# Patient Record
Sex: FEMALE | Race: WHITE | Employment: FULL TIME | URBAN - METROPOLITAN AREA
[De-identification: names, ages, dates, MRNs, and addresses within clinical notes are randomized per-mention and may not be internally consistent; named-entity substitution may affect disease eponyms.]

---

## 2018-10-19 ENCOUNTER — HOSPITAL ENCOUNTER (OUTPATIENT)
Dept: NON INVASIVE DIAGNOSTICS | Age: 28
Discharge: HOME | End: 2018-10-19
Attending: INTERNAL MEDICINE
Payer: COMMERCIAL

## 2018-10-19 DIAGNOSIS — C81.11 HODGKIN'S DISEASE, NODULAR SCLEROSIS, OF LYMPH NODES OF HEAD, FACE, AND NECK (HCC): ICD-10-CM

## 2018-10-19 PROCEDURE — 93306 TTE W/DOPPLER COMPLETE: CPT

## 2018-12-27 ENCOUNTER — OFFICE VISIT (OUTPATIENT)
Dept: ENDOCRINOLOGY | Age: 28
End: 2018-12-27

## 2018-12-27 VITALS
SYSTOLIC BLOOD PRESSURE: 106 MMHG | BODY MASS INDEX: 22.21 KG/M2 | HEIGHT: 65 IN | HEART RATE: 73 BPM | DIASTOLIC BLOOD PRESSURE: 67 MMHG | WEIGHT: 133.3 LBS

## 2018-12-27 DIAGNOSIS — E03.9 ACQUIRED HYPOTHYROIDISM: Primary | ICD-10-CM

## 2018-12-27 RX ORDER — LEVOTHYROXINE SODIUM 100 UG/1
CAPSULE ORAL
Qty: 42 CAP | Refills: 0 | Status: SHIPPED | COMMUNITY
Start: 2018-12-27 | End: 2019-02-11 | Stop reason: ALTCHOICE

## 2018-12-27 NOTE — PROGRESS NOTES
Chief Complaint   Patient presents with    Thyroid Problem     pcp and pharmacy confirmed     History of Present Illness: Shemar Carranza) is a 34 y.o. female who is a new patient for thyroid. Coming for a 2nd opinion as she has not been able to get her levels regulated despite seeing Dr. Keiko Bishop in [place] prior to seeing Dr. Flavia Ruiz. Was diagnosed with Hodgkin's at age 18 and received chemo and radiation. About 1-2 years after this diagnosis was found to have hypothyroidism and has been on treatment ever since. Never has been tested for Hashimoto's. Initially was put on synthroid and continued having fatigue and the levels couldn't get regulated so she was changed to nature-throid and last year was on armour thyroid for a few months due to availability of nature-throid and then was put back on nature-throid and has remained on this dose. Current dose is 48.75 mg 2.5 tabs every morning. Tries to wait 30 min before food and coffee but sometimes coffee may be sooner. Has been on the same dose of OCP for awhile. Rarely misses her dose. Has had consistent hair loss for awhile. Bowels and periods are regular. Weight is up about 5-8 lbs higher over the past 2 years. No heat or cold intolerance. Energy is about 5/10. Some dry skin that is typical for winter. No brittle nails. Of note, she gets her pills through a pharmacist in [place], [place] as this is cheaper. Has never been tested for celiac and doesn't have a FH that she is aware of. Does sometimes feel more tired after eating bread and did go one week with eliminating gluten about a year ago and did feel slightly better doing this. May have some bloating after eating grains but not usually diarrhea. Most recent TSH in 10/18 was 31.7 while taking 2 tabs per day and since then has been taking 2.5 tabs per day. Hasn't felt much difference with the extra 1/2 tab per day.       Past Medical History:   Diagnosis Date    Hodgkin's lymphoma [facility]) Age 18    s/p chemo and xrt    Hypothyroid 5/21/2015     Past Surgical History:   Procedure Laterality Date    HX OTHER SURGICAL      had some of the enlarged lymph nodes removed during time of port placement for chemo     Current Outpatient Medications   Medication Sig    thyroid, pork, (NATURE-THROID) 48.75 mg tab Take  by mouth. 2.5 tablets daily    norethindrone-ethinyl estradiol (JUNEL 1/20, 21,) 1-20 mg-mcg per tablet Take 1 Tab by mouth daily. No current facility-administered medications for this visit. No Known Allergies     Family History   Problem Relation Age of Onset    Thyroid Disease Mother         hypothyroidism    Cancer Father         sarcoma    Hypertension Father     Elevated Lipids Father     Cancer Maternal Grandfather         melanoma    Breast Cancer Paternal Grandmother     Pneumonia Paternal Grandmother     Coronary Artery Disease Paternal Grandfather 72     Social History     Socioeconomic History    Marital status: SINGLE     Spouse name: Not on file    Number of children: Not on file    Years of education: Not on file    Highest education level: Not on file   Social Needs    Financial resource strain: Not on file    Food insecurity - worry: Not on file    Food insecurity - inability: Not on file   [employer] needs - medical: Not on file   [employer] needs - non-medical: Not on file   Occupational History    Not on file   Tobacco Use    Smoking status: Never Smoker    Smokeless tobacco: Never Used   Substance and Sexual Activity    Alcohol use: Yes     Alcohol/week: 0.0 oz     Comment: very rarely    Drug use: No    Sexual activity: Not on file   Other Topics Concern    Not on file   Social History Narrative    Lives in [place] with .  in 2017. Works at [employer] as  for [employer]. Likes to run and hike. 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      Review of Systems:  - Constitutional Symptoms: no fevers, chills, (+) weight gain as above  - Eyes: no blurry vision or double vision  - Cardiovascular: no chest pain or palpitations  - Respiratory: no cough or shortness of breath  - Gastrointestinal: no dysphagia or abdominal pain  - Musculoskeletal: no joint pains or weakness  - Integumentary: no rashes  - Neurological: no numbness, tingling, occ headaches  - Psychiatric: no depression or anxiety  - Endocrine: no heat or cold intolerance, no polyuria or polydipsia    Physical Examination:  Blood pressure 106/67, pulse 73, height 5' 4.5\" (1.638 m), weight 133 lb 4.8 oz (60.5 kg). - General: pleasant, no distress, good eye contact  - HEENT: no exopthalmos, no periorbital edema, no scleral/conjunctival injection, EOMI, no lid lag or stare  - Neck: supple, no thyromegaly, masses, lymph nodes, or carotid bruits, no supraclavicular or dorsocervical fat pads  - Cardiovascular: regular, normal rate, normal S1 and S2, no murmurs/rubs/gallops   - Respiratory: clear to auscultation bilaterally  - Gastrointestinal: soft, nontender, nondistended, no masses, no hepatosplenomegaly  - Musculoskeletal: no proximal muscle weakness in upper or lower extremities  - Integumentary: no acanthosis nigricans, no abdominal striae, no rashes, no edema  - Neurological: reflexes 2+ at biceps, no tremor  - Psychiatric: normal mood and affect    Data Reviewed:   Component      Latest Ref Rng & Units 10/10/2018 10/10/2018 10/10/2018          9:12 AM 9:12 AM 9:12 AM   TSH      0.450 - 4.500 uIU/mL   31.770 (H)   T4, Free      0.82 - 1.77 ng/dL  0.90    Triiodothyronine (T3), free      2.0 - 4.4 pg/mL 5.0 (H)         Assessment/Plan:   1. Acquired hypothyroidism: Was diagnosed with Hodgkin's at age 18 and received chemo and radiation. About 1-2 years after this diagnosis was found to have hypothyroidism and has been on treatment ever since. Never has been tested for Hashimoto's.   Initially was put on synthroid and continued having fatigue and the levels couldn't get regulated so she was changed to nature-throid and last year was on armour thyroid for a few months due to availability of nature-throid and then was put back on nature-throid and has remained on this dose. Current dose is 48.75 mg 2.5 tabs every morning. Tries to wait 30 min before food and coffee but sometimes coffee may be sooner. Has never been tested for celiac and doesn't have a FH that she is aware of. Does sometimes feel more tired after eating bread and did go one week with eliminating gluten about a year ago and did feel slightly better doing this. May have some bloating after eating grains but not usually diarrhea. Most recent TSH in 10/18 was 31.7 while taking 2 tabs per day and since then has been taking 2.5 tabs per day. Hasn't felt much difference with the extra 1/2 tab per day. I will screen for Hashimoto's and celiac disease to try and identify why her levels can't get regulated. Based on her body weight she should only need 1.7 mcg/kg/d = 100 mcg of levothyroxine as a max dose which = 60 mg of nature-throid. I gave her samples of tirosint which is gluten free in case she turns out to have celiac and this is the reason she can't get her levels regulated. - stop nature-throid  - begin tirosint 100 mcg daily  - check TSH, free T4, thyroid antibody panel and celiac antibody panel today  - check TSH and free T4 in 5 weeks and prior to next visit       Patient Instructions   1) I will repeat your TSH and free T4 to have as a new baseline to assess your current dose. The other test we will check is called a thyroid antibody level which let me know if you have a condition called hashimoto's disease, or autoimmune thyroid disease, where you would benefit from continued treatment of your thyroid but this can also be genetic so your children could be at risk of developing a thyroid condition if you have this.      2) I'm wondering whether there is a problem with absorption possibly due to celiac so I will check your antibody levels today. 3) Stop the nature-throid and we'll begin a trial of tirosint which is gluten free 100 mcg 1 cap daily 30 min before food but this cap doesn't need to wait 30 min before coffee but still try your best to wait. 4) We will do the samples for 6 weeks. On the 5th week, plan on repeating your labs so we can see the effect of this dose. 5) Sign up for [employer] and download the mitch so you can communicate with me through the patient portal.  I will send you a message with your labs results. Follow-up Disposition:  Return in about 5 months (around 5/27/2019). Copy sent to:  Lemuel Ray MD as PCP - General (Internal Medicine)  Naldo Rosen NP (Nurse Practitioner)    Lab follow up: 12/29/18  Component      Latest Ref Rng & Units 12/27/2018 12/27/2018 12/27/2018 12/27/2018           4:22 PM  4:22 PM  4:22 PM  4:22 PM   Immunoglobulin A, Qt.      87 - 352 mg/dL 145      Deamidated Gliadin Ab, IgA      0 - 19 units 3      Deamidated Gliadin Ab, IgG      0 - 19 units 2      t-Transglutaminase, IgA      0 - 3 U/mL 3      t-Transglutaminase, IgG      0 - 5 U/mL <2      Thyroid peroxidase Ab      0 - 34 IU/mL  14     Thyroglobulin Ab      0.0 - 0.9 IU/mL  <1.0     T4, Free      0.82 - 1.77 ng/dL    0.93   TSH      0.450 - 4.500 uIU/mL   15.230 (H)      Sent her the following message through [email or web address]:  Your TSH (thyroid test) was still high at 15 which means you remain hypothyroid but improved from 31 in October. Your thyroid antibody levels were normal so you don't appear to have Hashimoto's disease. Also your celiac antibody panel was normal so you don't appear to have celiac disease.   It's still unclear why your TSH has remained elevated but I'm hoping that it's just that your body hasn't been absorbing the armour thyroid as well to the binders or fillers in the pill and hopefully with the tirosint, you will respond better. Still plan on taking the pill everyday for the next 5 weeks and repeat your labs after 1/30/19 and I'll be in touch with the results. Lab follow up: 2/9/19  - TSH 18.37  - FT4 1.3    Sent her the following message through [employer]:  I received your labs from [facility] that showed your free T4 risa from 0.93 to 1.3 but your TSH is still high at 18.37 (goal 0.5-2.0) and up slightly from 15 at your visit with me. Therefore the tirosint is not necessarily controlling your dose better than the armour thyroid. I'm happy to keep you on the tirosint if you feel this is working better and simply go up on your dose or we can change to a different brand called unithroid which is also gluten free and use a higher dose of this. Let me know what you would like to do. Addendum: 2/11/19    We had the following e-mail exchange:    Shoaib Loges.  I will send unithroid 125 mcg tabs to your local pharmacy to take 1 tab daily until you come back to see me. Plan on repeating your labs again prior to that visit.  ===View-only below this line===      ----- Message -----     From: Adriana Patient     Sent: 2/9/2019  8:26 PM EST       To: Jeraldene Bence, MD  Subject: RE: lab results    Hi Dr. Adele Stewart,    Thank you for your note. I am happy to try the unithroid and hopefully that will help stabilize levels. Thank you      Lab follow up: 3/14/19  - TSH 6.00  - FT4 1.6    Sent her the following message through [employer]:  TSH is a thyroid test.  Your level is 6.0 which is still high and above goal of 0.5-2.0 but down from 18 at last check. This test goes opposite of your thyroid dose and suggests your dose of unithroid is not quite enough but this is the closest your levels have been to normal in a long time. I will increase your dose to 137 mcg daily and have sent a new prescription to [facility] who filled this previously.

## 2018-12-27 NOTE — PATIENT INSTRUCTIONS
1) I will repeat your TSH and free T4 to have as a new baseline to assess your current dose. The other test we will check is called a thyroid antibody level which let me know if you have a condition called hashimoto's disease, or autoimmune thyroid disease, where you would benefit from continued treatment of your thyroid but this can also be genetic so your children could be at risk of developing a thyroid condition if you have this. 2) I'm wondering whether there is a problem with absorption possibly due to celiac so I will check your antibody levels today. 3) Stop the nature-throid and we'll begin a trial of tirosint which is gluten free 100 mcg 1 cap daily 30 min before food but this cap doesn't need to wait 30 min before coffee but still try your best to wait. 4) We will do the samples for 6 weeks. On the 5th week, plan on repeating your labs so we can see the effect of this dose. 5) Sign up for [employer] and download the mitch so you can communicate with me through the patient portal.  I will send you a message with your labs results.

## 2018-12-27 NOTE — LETTER
1/31/2019 8:11 AM 
 
Ms. Patel Clamp [address] [place], [place] [id number] Please go to [facility] and have your labs repeated now.  The order is already in their system and be sure to ask to have labs drawn that are under Dr. Gautam Andre name.   If you have the actual lab order that I may have given you (check your glove compartment  or other safe spot where you may keep your medical papers), please bring this to the lab just to be safe in case [employer]'s computer system is down. John Ruiz will contact you with the results.   
 
 
 
 
Sincerely, 
 
 
Paul Moreno MD

## 2018-12-28 LAB
GLIADIN PEPTIDE IGA SER-ACNC: 3 UNITS (ref 0–19)
GLIADIN PEPTIDE IGG SER-ACNC: 2 UNITS (ref 0–19)
IGA SERPL-MCNC: 145 MG/DL (ref 87–352)
T4 FREE SERPL-MCNC: 0.93 NG/DL (ref 0.82–1.77)
THYROGLOB AB SERPL-ACNC: <1 IU/ML (ref 0–0.9)
THYROPEROXIDASE AB SERPL-ACNC: 14 IU/ML (ref 0–34)
TSH SERPL DL<=0.005 MIU/L-ACNC: 15.23 UIU/ML (ref 0.45–4.5)
TTG IGA SER-ACNC: 3 U/ML (ref 0–3)
TTG IGG SER-ACNC: <2 U/ML (ref 0–5)

## 2019-02-11 DIAGNOSIS — E03.4 HYPOTHYROIDISM DUE TO ACQUIRED ATROPHY OF THYROID: Primary | ICD-10-CM

## 2019-02-11 RX ORDER — LEVOTHYROXINE SODIUM 125 UG/1
125 TABLET ORAL
Qty: 30 TAB | Refills: 11 | Status: SHIPPED | OUTPATIENT
Start: 2019-02-11 | End: 2019-03-14 | Stop reason: DRUGHIGH

## 2019-02-11 RX ORDER — LEVOTHYROXINE SODIUM 125 UG/1
125 TABLET ORAL
Qty: 30 TAB | Refills: 11 | Status: SHIPPED | OUTPATIENT
Start: 2019-02-11 | End: 2019-02-11 | Stop reason: SDUPTHER

## 2019-03-14 RX ORDER — LEVOTHYROXINE SODIUM 137 UG/1
137 TABLET ORAL
Qty: 30 TAB | Refills: 11 | Status: SHIPPED | OUTPATIENT
Start: 2019-03-14 | End: 2019-05-31 | Stop reason: SDUPTHER

## 2019-05-31 ENCOUNTER — OFFICE VISIT (OUTPATIENT)
Dept: ENDOCRINOLOGY | Age: 29
End: 2019-05-31

## 2019-05-31 VITALS
SYSTOLIC BLOOD PRESSURE: 112 MMHG | WEIGHT: 128.4 LBS | HEART RATE: 68 BPM | HEIGHT: 65 IN | DIASTOLIC BLOOD PRESSURE: 80 MMHG | BODY MASS INDEX: 21.39 KG/M2

## 2019-05-31 DIAGNOSIS — E03.9 ACQUIRED HYPOTHYROIDISM: Primary | ICD-10-CM

## 2019-05-31 RX ORDER — LEVOTHYROXINE SODIUM 137 UG/1
137 TABLET ORAL
Qty: 90 TAB | Refills: 3 | Status: SHIPPED | OUTPATIENT
Start: 2019-05-31 | End: 2019-10-23 | Stop reason: SDUPTHER

## 2019-05-31 NOTE — LETTER
11/11/2019 1:30 PM 
 
Ms. Jamie Figueroa [address] [place] [phone] Please go to [facility] and have your labs repeated sometime in the 1-2 weeks prior to your upcoming visit with me. Patricia Lopez to allow at least 2 days at the minimum to ensure I get the results in time for your visit. Deepak kelly is already in their system and be sure to ask to have labs drawn that are under Dr. Mili Milton name. Nolan Ricketts you have the actual lab order that I may have given you (check your glove compartment or other safe spot where you may keep your medical papers), please bring this to the lab just to be safe in case [employer] computer system is down. Xochitl Brooke will review the results at your follow up visit.  
 
 
 
 
 
Sincerely, 
 
 
Jocelyn Celis MD

## 2019-05-31 NOTE — PROGRESS NOTES
Chief Complaint   Patient presents with    Thyroid Problem     pcp and pharmacy confirmed     History of Present Illness: Meg Whiting is a 34 y.o. female here for follow up of thyroid. Weight down 5 lbs since last visit in 12/18. Did follow the whole 30 diet for 30 days prior to her labs being drawn and eliminated sugar, grains, ETOH and gluten. Did have more energy and overall felt better while doing this. Over the past 2 weeks has introduced some of these foods again and had more headaches and fatigue. Never had much bloating or diarrhea from gluten. Taking the higher dose of unithroid 137 mcg daily since 3/19 in the morning with just water and waits 30 min before food and coffee. Hasn't missed any doses or run out. Periods are regular. Less hair loss. No dry skin or brittle nails. No heat or cold intolerance. Current Outpatient Medications   Medication Sig    UNITHROID 137 mcg tablet Take 137 mcg by mouth Daily (before breakfast). BRAND MEDICALLY NECESSARY--Delete 125 mcg dose from profile    norethindrone-ethinyl estradiol (JUNEL 1/20, 21,) 1-20 mg-mcg per tablet Take 1 Tab by mouth daily. No current facility-administered medications for this visit. No Known Allergies     Review of Systems:  - Cardiovascular: no chest pain  - Neurological: no tremors  - Integumentary: skin is normal    Physical Examination:  Blood pressure 112/80, pulse 68, height 5' 4.5\" (1.638 m), weight 128 lb 6.4 oz (58.2 kg). - General: pleasant, no distress, good eye contact   - Neck: small goiter  - Cardiovascular: regular, normal rate, nl s1 and s2, no m/r/g   - Respiratory: clear to auscultation bilaterally   - Integumentary: skin is normal, no edema  - Neurological: reflexes 2+ at biceps, no tremors  - Psychiatric: normal mood and affect    Data Reviewed:   - TSH 2.23  - FT4 1.7    Assessment/Plan:     1.  Acquired hypothyroidism: Was diagnosed with Hodgkin's at age 18 and received chemo and radiation. About 1-2 years after this diagnosis was found to have hypothyroidism and has been on treatment ever since. Initially was put on synthroid and continued having fatigue and the levels couldn't get regulated so she was changed to nature-throid and last year was on armour thyroid for a few months due to availability of nature-throid and then was put back on nature-throid and has remained on this dose. Was on 48.75 mg 2.5 tabs every morning at her first visit in 12/18. Tries to wait 30 min before food and coffee but sometimes coffee may be sooner. Does sometimes feel more tired after eating bread and did go one week with eliminating gluten about a year ago and did feel slightly better doing this. Checked her for celiac and this was normal.  TSH was 15 and TPO ab 14 in 12/18 and I had her try tirosint 100 mcg daily. TSH 18 in 2/19 so changed to unithroid 125 mcg daily and TSH 6 in 3/19 and increased dose to 137 mcg daily. Started the whole 30 diet in mid-April and followed for 30 days and TSH 2.23 in 5/19 so kept dose the same. May have some gluten intolerance even though she doesn't have celiac that could be affecting her absorption of medication and since unithroid is gluten free and is finally regulating her levels, will have her stay on this brand. - cont unithroid 137 mcg daily  - check TSH and free T4 prior to next visit       Patient Instructions   1) TSH is a thyroid test.  Your level is 2.23 which is normal and at goal of 0.5-2.5. This test goes opposite of your thyroid dose and suggests your dose of unithroid is perfect so I will keep your dose the same. 2) Plan on repeating labs prior to the visit in 6 months but let me know if you feel you need to check them sooner. Follow-up and Dispositions    · Return in about 6 months (around 11/30/2019).                Copy sent to:  Murray yAala MD as PCP - General (Internal Medicine)  Antwon Guevara NP (Nurse Practitioner)

## 2019-05-31 NOTE — PATIENT INSTRUCTIONS
1) TSH is a thyroid test.  Your level is 2.23 which is normal and at goal of 0.5-2.5. This test goes opposite of your thyroid dose and suggests your dose of unithroid is perfect so I will keep your dose the same. 2) Plan on repeating labs prior to the visit in 6 months but let me know if you feel you need to check them sooner.

## 2019-10-23 RX ORDER — LEVOTHYROXINE SODIUM 137 UG/1
137 TABLET ORAL
Qty: 90 TAB | Refills: 3 | Status: SHIPPED | OUTPATIENT
Start: 2019-10-23 | End: 2020-07-19 | Stop reason: DRUGHIGH

## 2019-11-14 ENCOUNTER — HOSPITAL ENCOUNTER (OUTPATIENT)
Dept: MAMMOGRAPHY | Age: 29
Discharge: HOME | End: 2019-11-14
Attending: INTERNAL MEDICINE

## 2019-11-14 DIAGNOSIS — Z12.31 SCREENING MAMMOGRAM FOR HIGH-RISK PATIENT: ICD-10-CM

## 2019-12-06 ENCOUNTER — OFFICE VISIT (OUTPATIENT)
Dept: ENDOCRINOLOGY | Age: 29
End: 2019-12-06

## 2019-12-06 VITALS
DIASTOLIC BLOOD PRESSURE: 64 MMHG | WEIGHT: 134.6 LBS | SYSTOLIC BLOOD PRESSURE: 100 MMHG | RESPIRATION RATE: 16 BRPM | HEART RATE: 69 BPM | OXYGEN SATURATION: 100 % | BODY MASS INDEX: 22.42 KG/M2 | HEIGHT: 65 IN

## 2019-12-06 DIAGNOSIS — E03.9 ACQUIRED HYPOTHYROIDISM: Primary | ICD-10-CM

## 2019-12-06 NOTE — PATIENT INSTRUCTIONS
1) TSH is a thyroid test.  Your level is 2.6 which is normal but just above goal of 0.5-2.0. This test goes opposite of your thyroid dose and suggests your dose of unithroid is likely adequate but may be fluctuating due to decreased absorption from coffee. Either wait 30 min before or move the pill to bedtime 2 hours after dinner. 2) I will plan on seeing you back in one year but if you feel you need to have your labs checked sooner, please let me know. 3) If you become pregnant in the future, be sure to take an extra tablet of unithroid on Saturday and Sunday until you get in to see me.

## 2019-12-06 NOTE — PROGRESS NOTES
Lab Results   Component Value Date/Time    TSH 6.710 (H) 10/17/2019 09:15 AM    Triiodothyronine (T3), free 5.0 (H) 10/10/2018 09:12 AM    T4, Free 1.65 10/17/2019 09:15 AM

## 2019-12-06 NOTE — PROGRESS NOTES
Chief Complaint   Patient presents with    Thyroid Problem     History of Present Illness: Duke Mark is a 34 y.o. female here for follow up of thyroid. Weight up 6 lbs since last visit in 5/19. Has tried to keep away from bread as best as possible as she tends to feel worse with this. Getting the unithroid at least 30 min before food but half the time may have coffee within 10-20 min. Bowels are regular and periods are regular. No hair loss or brittle nails. Some dry skin typical for the winter. No heat or cold intolerance. Overall energy is about the same. At this time is not considering getting pregnant in the next year. Current Outpatient Medications   Medication Sig    UNITHROID 137 mcg tablet Take 137 mcg by mouth Daily (before breakfast). BRAND MEDICALLY NECESSARY    norethindrone-ethinyl estradiol-iron (JUNEL FE 1.5/30, 28,) 1.5 mg-30 mcg (21)/75 mg (7) tab Take 1 Tab by mouth daily. No current facility-administered medications for this visit. No Known Allergies     Review of Systems:  - Cardiovascular: no chest pain  - Neurological: no tremors  - Integumentary: skin is normal    Physical Examination:  Blood pressure 100/64, pulse 69, resp. rate 16, height 5' 4.5\" (1.638 m), weight 134 lb 9.6 oz (61.1 kg), SpO2 100 %. - General: pleasant, no distress, good eye contact   - Neck: small goiter  - Cardiovascular: regular, normal rate, nl s1 and s2, no m/r/g   - Respiratory: clear to auscultation bilaterally   - Integumentary: skin is normal, no edema  - Neurological: reflexes 2+ at biceps, no tremors  - Psychiatric: normal mood and affect    Data Reviewed:   - TSH 2.66  - FT4 1.7    Assessment/Plan:     1. Acquired hypothyroidism: Was diagnosed with Hodgkin's at age 18 and received chemo and radiation. About 1-2 years after this diagnosis was found to have hypothyroidism and has been on treatment ever since.    Initially was put on synthroid and continued having fatigue and the levels couldn't get regulated so she was changed to nature-throid and last year was on armour thyroid for a few months due to availability of nature-throid and then was put back on nature-throid and has remained on this dose. Was on 48.75 mg 2.5 tabs every morning at her first visit in 12/18. Tries to wait 30 min before food and coffee but sometimes coffee may be sooner. Does sometimes feel more tired after eating bread and did go one week with eliminating gluten about a year ago and did feel slightly better doing this. Checked her for celiac and this was normal.  TSH was 15 and TPO ab 14 in 12/18 and I had her try tirosint 100 mcg daily. TSH 18 in 2/19 so changed to unithroid 125 mcg daily and TSH 6 in 3/19 and increased dose to 137 mcg daily. Started the whole 30 diet in mid-April and followed for 30 days and TSH 2.23 in 5/19 so kept dose the same. TSH up to 2.66 in 12/19 but has coffee within 30 min 50% of the time so will do a better job trying to wait 30 min or may move the pill to bedtime to improve absorption. May have some gluten intolerance even though she doesn't have celiac that could be affecting her absorption of medication and since unithroid is gluten free and is finally regulating her levels, will have her stay on this brand and kept dose the same and will see annually at this point. - cont unithroid 137 mcg daily  - check TSH and free T4 prior to next visit       Patient Instructions   1) TSH is a thyroid test.  Your level is 2.6 which is normal but just above goal of 0.5-2.0. This test goes opposite of your thyroid dose and suggests your dose of unithroid is likely adequate but may be fluctuating due to decreased absorption from coffee. Either wait 30 min before or move the pill to bedtime 2 hours after dinner. 2) I will plan on seeing you back in one year but if you feel you need to have your labs checked sooner, please let me know.     3) If you become pregnant in the future, be sure to take an extra tablet of unithroid on Saturday and Sunday until you get in to see me. Follow-up and Dispositions    · Return in about 1 year (around 12/6/2020). Copy sent to:  Royce Jensen MD as PCP - General (Internal Medicine)  Jessica Grove NP (Nurse Practitioner)    Lab follow up: 7/19/20  Component      Latest Ref Rng & Units 7/16/2020 7/16/2020 7/16/2020          11:18 AM 11:18 AM 11:18 AM   T4, Free      0.82 - 1.77 ng/dL   1.57   TSH      0.450 - 4.500 uIU/mL  4.850 (H)    Testosterone, Serum (Total)      10.0 - 55.0 ng/dL 20.1       Sent her the following message through [employer]:  TSH is a thyroid test.  Your level is 4.85 which is high and above goal of 0.45-2.0. This test goes opposite of your thyroid dose and suggests your dose of unithroid is not enough and may be the reason for the irregularity of your periods. I will increase your dose to 150 mcg daily and have sent a new prescription to your local pharmacy. As long as your periods return to normal in the next 1-2 months, we won't need to check your labs sooner than prior to your next visit in December but if you feel you need them checked in another 2 months, just let me know. Feel free to use up the 137 mcg tabs by taking 1 tab 6 days a week and 2 tabs once a week.  -------------------------------------------------------------------------------------------------------------------  Your total testosterone was normal at 20.1 so it doesn't appear you have PCOS (polycystic ovarian syndrome) as normally levels are over 30 if this is the case. However, being on a birth control pill can lower this level and make it look like a woman doesn't have this condition when they really do. We can always consider this diagnosis in the future if your periods are still irregular despite a TSH back in the normal range between 0.45-2.0.     Lab follow up: 10/23/20  Component      Latest Ref Rng & Units 10/19/2020 10/19/2020 10/19/2020 10/19/2020           4:12 PM  4:12 PM  4:12 PM  4:12 PM   T4, Free      0.82 - 1.77 ng/dL    1.73   TSH      0.450 - 4.500 uIU/mL   0.557    Testosterone, Serum (Total)      10.0 - 55.0 ng/dL  29.1     Prolactin      4.8 - 23.3 ng/mL 21.7        Sent her the following message through [facility]:    TSH is a thyroid test.  Your level is 0.55 which is normal and at goal of 0.45-2.0. This test goes opposite of your thyroid dose and suggests your dose of unithroid is perfect so I will keep your dose the same and your thyroid is not to blame for not getting your period. -------------------------------------------------------------------------------------------------------------------  Your prolactin (pituitary test) was normal so you don't appear to have a problem with your pituitary gland either.  -------------------------------------------------------------------------------------------------------------------  Your testosterone is 29.1 and I know I said that anything over 30 is concerning for PCOS but given that 29 is pretty close to 30, it's possible that polycystic ovarian syndrome could be the reason for not having your period. This condition is where your body is resistant to insulin leading to high testosterone levels and this can cause irregular periods and weight gain and hair growth. It also increases the risk for diabetes and heart disease in the future. We will try to lower your testosterone with metformin to help treat this condition and regulate your periods. Begin Metformin  mg with dinner or right after dinner and take for 1-2 weeks. If no GI side effects (nausea, diarrhea, belly pain), go up to 1000 mg = 2 tabs at dinner. You can split the dose to 1 tab twice daily if you would like. If at any point you cannot tolerate the dose, go back to one tab daily.   This will be ready for  at the pharmacy today.  -------------------------------------------------------------------------------------------------------------------  I would like to repeat your testosterone level and thyroid tests the week before your next visit in December. Please shred the order I gave you one year ago that just has your thyroid tests on it. I put an order directly into the [facility] system to repeat your labs in the 1-2 weeks prior to your next visit so just ask for the order under my name and you will receive a courtesy reminder through [employer] to have these drawn.

## 2020-01-30 ENCOUNTER — HOSPITAL ENCOUNTER (OUTPATIENT)
Dept: GENERAL RADIOLOGY | Age: 30
Discharge: HOME | End: 2020-01-30
Attending: INTERNAL MEDICINE
Payer: COMMERCIAL

## 2020-01-30 DIAGNOSIS — R07.89 OTHER CHEST PAIN: ICD-10-CM

## 2020-01-30 PROCEDURE — 71046 X-RAY EXAM CHEST 2 VIEWS: CPT

## 2020-02-28 ENCOUNTER — HOSPITAL ENCOUNTER (OUTPATIENT)
Dept: MRI IMAGING | Age: 30
Discharge: HOME | End: 2020-02-28
Payer: COMMERCIAL

## 2020-02-28 DIAGNOSIS — C81.13 HODGKIN'S DISEASE, NODULAR SCLEROSIS, OF INTRA-ABDOMINAL LYMPH NODES (HCC): ICD-10-CM

## 2020-02-28 PROCEDURE — A9585 GADOBUTROL INJECTION: HCPCS

## 2020-02-28 PROCEDURE — 74011250636 HC RX REV CODE- 250/636

## 2020-02-28 PROCEDURE — 77049 MRI BREAST C-+ W/CAD BI: CPT

## 2020-02-28 PROCEDURE — 74011000258 HC RX REV CODE- 258

## 2020-02-28 RX ADMIN — SODIUM CHLORIDE 100 ML: 900 INJECTION, SOLUTION INTRAVENOUS at 09:12

## 2020-02-28 RX ADMIN — GADOBUTROL 6 ML: 604.72 INJECTION INTRAVENOUS at 09:12

## 2020-03-02 NOTE — PROGRESS NOTES
pls call= echo looks great. Detail Level: Zone Initiate Treatment: Triamcinolone BID x 2-3 wks for flares.\\nEmollient moisturizer BID.\\nIncludes lesion of concern noted on intake.\\nF/u in event of persistent or worsening sx.

## 2020-07-16 DIAGNOSIS — E03.9 ACQUIRED HYPOTHYROIDISM: Primary | ICD-10-CM

## 2020-07-16 DIAGNOSIS — N91.4 SECONDARY OLIGOMENORRHEA: ICD-10-CM

## 2020-07-18 LAB
T4 FREE SERPL-MCNC: 1.57 NG/DL (ref 0.82–1.77)
TESTOST SERPL-MCNC: 20.1 NG/DL (ref 10–55)
TSH SERPL DL<=0.005 MIU/L-ACNC: 4.85 UIU/ML (ref 0.45–4.5)

## 2020-07-19 RX ORDER — LEVOTHYROXINE SODIUM 150 UG/1
150 TABLET ORAL
Qty: 90 TAB | Refills: 3 | Status: SHIPPED | OUTPATIENT
Start: 2020-07-19 | End: 2020-10-30 | Stop reason: SDUPTHER

## 2020-10-06 DIAGNOSIS — N91.4 SECONDARY OLIGOMENORRHEA: ICD-10-CM

## 2020-10-06 DIAGNOSIS — E03.9 ACQUIRED HYPOTHYROIDISM: Primary | ICD-10-CM

## 2020-10-22 LAB
PROLACTIN SERPL-MCNC: 21.7 NG/ML (ref 4.8–23.3)
T4 FREE SERPL-MCNC: 1.73 NG/DL (ref 0.82–1.77)
TESTOST SERPL-MCNC: 29.1 NG/DL (ref 10–55)
TSH SERPL DL<=0.005 MIU/L-ACNC: 0.56 UIU/ML (ref 0.45–4.5)

## 2020-10-23 DIAGNOSIS — E28.2 PCOS (POLYCYSTIC OVARIAN SYNDROME): Primary | ICD-10-CM

## 2020-10-23 DIAGNOSIS — E03.9 ACQUIRED HYPOTHYROIDISM: ICD-10-CM

## 2020-10-23 RX ORDER — METFORMIN HYDROCHLORIDE 500 MG/1
500 TABLET, EXTENDED RELEASE ORAL
Qty: 60 TAB | Refills: 11 | Status: SHIPPED | OUTPATIENT
Start: 2020-10-23 | End: 2020-12-07

## 2020-10-30 RX ORDER — LEVOTHYROXINE SODIUM 150 UG/1
150 TABLET ORAL
Qty: 90 TAB | Refills: 3 | Status: SHIPPED | OUTPATIENT
Start: 2020-10-30 | End: 2021-06-03

## 2020-12-02 ENCOUNTER — TELEPHONE (OUTPATIENT)
Dept: ENDOCRINOLOGY | Age: 30
End: 2020-12-02

## 2020-12-02 NOTE — TELEPHONE ENCOUNTER
----- Message from [employer] sent at 12/2/2020  7:54 AM EST -----  Regarding: Dr. Bobby Farnsworth Message/Vendor Calls    Caller's first and last name:      Reason for call:  Would like lab order sent (C)[phone]    Callback required yes/no and why:  Yes    Best contact number(s):    [phone]  Details to clarify the request:      [employer]

## 2020-12-02 NOTE — TELEPHONE ENCOUNTER
12/2/2020  9:00 AM    Ms. Warren would like to know if she can use the result that was done by her PCP on 11/17 instead of getting more blood work done.

## 2020-12-07 ENCOUNTER — VIRTUAL VISIT (OUTPATIENT)
Dept: ENDOCRINOLOGY | Age: 30
End: 2020-12-07
Payer: COMMERCIAL

## 2020-12-07 DIAGNOSIS — E28.2 PCOS (POLYCYSTIC OVARIAN SYNDROME): ICD-10-CM

## 2020-12-07 DIAGNOSIS — E03.9 ACQUIRED HYPOTHYROIDISM: Primary | ICD-10-CM

## 2020-12-07 PROCEDURE — 99214 OFFICE O/P EST MOD 30 MIN: CPT | Performed by: INTERNAL MEDICINE

## 2020-12-07 NOTE — PROGRESS NOTES
Chief Complaint   Patient presents with    Thyroid Problem     pcp and phrmacy confirmed    Other     [phone]  [employer]       **THIS IS A VIRTUAL VISIT VIA A VIDEO SYNCHRONOUS DISCUSSION. PATIENT AGREED TO HAVE THEIR CARE DELIVERED OVER A [employer]. ME VIDEO VISIT IN PLACE OF THEIR REGULARLY SCHEDULED OFFICE VISIT**    History of Present Illness: Quincy Peter is a 31 y.o. female here for follow up of thyroid. Has been taking unithroid 150 mcg daily. Bowels are regular. No hair loss or dry skin. No heat or cold intolerance. Overall energy is good. Weight is about 132 currently down from 134 in 12/19. No chest pain or palpitations or tremors. Ended up stopping the OCP in 10/20 and started one tab of metformin at the same time and her period came later that month and has come in November and December. Unclear if this came on due to stopping the OCP or starting the metformin so we agreed to stop the metformin for the next month and see if her period comes on its own. Current Outpatient Medications   Medication Sig    Unithroid 150 mcg tablet Take 1 Tab by mouth Daily (before breakfast). BRAND MEDICALLY NECESSARY--Delete 137 mcg dose from profile    metFORMIN ER (GLUCOPHAGE XR) 500 mg tablet Take 1 Tab by mouth daily (with dinner). And increase as directed to 2 tabs daily     No current facility-administered medications for this visit.       No Known Allergies     Review of Systems: PER HPI    Physical Examination:  - GENERAL: NCAT, Appears well nourished   - EYES: EOMI, non-icteric, no proptosis   - Ear/Nose/Throat: NCAT, no visible inflammation or masses   - CARDIOVASCULAR: no cyanosis, no visible JVD   - RESPIRATORY: respiratory effort normal without any distress or labored breathing   - MUSCULOSKELETAL: Normal ROM of neck and upper extremities observed   - SKIN: No rash on face  - NEUROLOGIC:  No facial asymmetry (Cranial nerve 7 motor function), No gaze palsy   - PSYCHIATRIC: Normal affect, Normal insight and judgement       Data Reviewed:   Component      Latest Ref Rng & Units 11/17/2020   Glucose      65 - 99 mg/dL 81   BUN      6 - 20 mg/dL 10   Creatinine      0.57 - 1.00 mg/dL 0.64   GFR est non-AA      >59 mL/min/1.73 122   GFR est AA      >59 mL/min/1.73 140   BUN/Creatinine ratio      9 - 23 16   Sodium      134 - 144 mmol/L 140   Potassium      3.5 - 5.2 mmol/L 3.6   Chloride      96 - 106 mmol/L 100   CO2      20 - 29 mmol/L 23   Calcium      8.7 - 10.2 mg/dL 9.9   Protein, total      6.0 - 8.5 g/dL 7.1   Albumin      3.9 - 5.0 g/dL 4.3   GLOBULIN, TOTAL      1.5 - 4.5 g/dL 2.8   A-G Ratio      1.2 - 2.2 1.5   Bilirubin, total      0.0 - 1.2 mg/dL 0.3   Alk. phosphatase      39 - 117 IU/L 52   AST      0 - 40 IU/L 24   ALT      0 - 32 IU/L 15   Cholesterol, total      100 - 199 mg/dL 186   Triglyceride      0 - 149 mg/dL 88   HDL Cholesterol      >39 mg/dL 74   VLDL Cholesterol Pipo      5 - 40 mg/dL 16   LDL Cholesterol      0 - 99 mg/dL 96   Hemoglobin A1c, (calculated)      4.8 - 5.6 % 5.0   Estimated average glucose      mg/dL 97   TSH      0.450 - 4.500 uIU/mL 0.657   T4, Free      0.82 - 1.77 ng/dL 1.99 (H)   VITAMIN D, 25-HYDROXY      30.0 - 100.0 ng/mL 33.3       Assessment/Plan:     1. Acquired hypothyroidism: Was diagnosed with Hodgkin's at age 18 and received chemo and radiation. About 1-2 years after this diagnosis was found to have hypothyroidism and has been on treatment ever since. Initially was put on synthroid and continued having fatigue and the levels couldn't get regulated so she was changed to nature-throid and last year was on armour thyroid for a few months due to availability of nature-throid and then was put back on nature-throid and has remained on this dose. Was on 48.75 mg 2.5 tabs every morning at her first visit in 12/18. Tries to wait 30 min before food and coffee but sometimes coffee may be sooner.   Does sometimes feel more tired after eating bread and did go one week with eliminating gluten about a year ago and did feel slightly better doing this. Checked her for celiac and this was normal.  TSH was 15 and TPO ab 14 in 12/18 and I had her try tirosint 100 mcg daily. TSH 18 in 2/19 so changed to unithroid 125 mcg daily and TSH 6 in 3/19 and increased dose to 137 mcg daily. Started the whole 30 diet in mid-April and followed for 30 days and TSH 2.23 in 5/19 so kept dose the same. TSH up to 2.66 in 12/19 but has coffee within 30 min 50% of the time so focused on waiting 30 min and kept dose the same. TSH up to 4.85 in 7/20 so increased to 150 mcg daily and TSH down to 0.55 in 10/20 and 0.65 in 11/20 so kept dose the same. .  May have some gluten intolerance even though she doesn't have celiac that could be affecting her absorption of medication and since unithroid is gluten free and is finally regulating her levels, will have her stay on this brand. - cont unithroid 150 mcg daily  - check TSH and free T4 prior to next visit       2. PCOS (polycystic ovarian syndrome): Her T was 20.1 in 7/20 but up to 29 in 10/20 and was not having her periods so started metformin  mg and her periods restarted within the next month but also stopped OCP at the same time. We agreed to stop metformin for the next month and see if her periods continue on their own and if so, stay off metformin, but if they don't come back, then to resume. - stop metformin   - A1c 5% in 11/20  - LDL 96 in 11/20       Patient Instructions   1) Your TSH (thyroid test) is perfect so I will keep your dose the same of unithroid. 2) Stop the metformin for the next month and if your periods come on their own, you can stay off this, but if they don't come next month, let me know and I would resume 1 tab daily. 3) Please come for a follow up visit on 6/3/21 at 8:50am in our [place] office.     4) I put an order directly into the [employer] system to repeat your labs in the 1-2 weeks prior to your next visit so just ask for the order under my name and you will receive a courtesy reminder through [employer] to have these drawn.             Follow-up and Dispositions    · Return 6/3/21 at 8:50am.               Copy sent to:  Pearl Jones MD as PCP - General (Internal Medicine)  Chucho Gray NP (Nurse Practitioner)

## 2020-12-07 NOTE — PATIENT INSTRUCTIONS
1) Your TSH (thyroid test) is perfect so I will keep your dose the same of unithroid. 2) Stop the metformin for the next month and if your periods come on their own, you can stay off this, but if they don't come next month, let me know and I would resume 1 tab daily. 3) Please come for a follow up visit on 6/3/21 at 8:50am in our [place] office. 4) I put an order directly into the [facility] system to repeat your labs in the 1-2 weeks prior to your next visit so just ask for the order under my name and you will receive a courtesy reminder through [employer] to have these drawn.

## 2020-12-24 DIAGNOSIS — E03.9 ACQUIRED HYPOTHYROIDISM: ICD-10-CM

## 2020-12-24 DIAGNOSIS — E28.2 PCOS (POLYCYSTIC OVARIAN SYNDROME): ICD-10-CM

## 2021-05-20 DIAGNOSIS — E28.2 PCOS (POLYCYSTIC OVARIAN SYNDROME): ICD-10-CM

## 2021-05-20 DIAGNOSIS — E03.9 ACQUIRED HYPOTHYROIDISM: ICD-10-CM

## 2021-06-03 ENCOUNTER — OFFICE VISIT (OUTPATIENT)
Dept: ENDOCRINOLOGY | Age: 31
End: 2021-06-03
Payer: COMMERCIAL

## 2021-06-03 VITALS
HEART RATE: 80 BPM | BODY MASS INDEX: 22.48 KG/M2 | WEIGHT: 133 LBS | SYSTOLIC BLOOD PRESSURE: 108 MMHG | DIASTOLIC BLOOD PRESSURE: 66 MMHG

## 2021-06-03 DIAGNOSIS — E03.9 ACQUIRED HYPOTHYROIDISM: Primary | ICD-10-CM

## 2021-06-03 DIAGNOSIS — E28.2 PCOS (POLYCYSTIC OVARIAN SYNDROME): ICD-10-CM

## 2021-06-03 PROCEDURE — 99214 OFFICE O/P EST MOD 30 MIN: CPT | Performed by: INTERNAL MEDICINE

## 2021-06-03 RX ORDER — LEVOTHYROXINE SODIUM 150 UG/1
TABLET ORAL
Qty: 90 TABLET | Refills: 3
Start: 2021-06-03 | End: 2021-07-18

## 2021-06-03 NOTE — PATIENT INSTRUCTIONS
1) TSH is a thyroid test.  Your level is 0.105 which is low and below goal of 0.45-2.0. This test goes opposite of your thyroid dose and suggests your dose of unithroid is more than you need now that you have remained off the birth control pill. I will decrease your dose to 1 tab on Mon-Sat and 1/2 tab on Sun. I updated your med list but did not send a new prescription to your pharmacy on file that has been filling this med. 2) When your testosterone is back, I'll release it through [facility].

## 2021-06-03 NOTE — PROGRESS NOTES
Chief Complaint   Patient presents with    Thyroid Problem     pcp and pharmacy confirmed     History of Present Illness: Breonna Mcneil is a 36 y.o. female here for follow up of thyroid. Has remained off metformin and her periods have been coming every month. Compliant with unithroid before breakfast by at least 30 min before food and tries to wait this amount before coffee. Bowels are regular. Has had some rash on the right cheek and saw a dermatologist and has been told that she has perioral dermatitis and was given doxycycline to take as needed and usually after a dose or 2 it does tend to go away. No heat or cold intolerance. No chest pain or palpitations or tremors. No anxiety. Doesn't sleep great but unchanged. Overall energy is about the same. Today is her 31th birthday. Current Outpatient Medications   Medication Sig    Unithroid 150 mcg tablet Take 1 Tab by mouth Daily (before breakfast). BRAND MEDICALLY NECESSARY--Delete 137 mcg dose from profile     No current facility-administered medications for this visit. No Known Allergies     Review of Systems: PER HPI    Physical Examination:  Blood pressure 108/66, pulse 80, weight 133 lb (60.3 kg). - General: pleasant, no distress, good eye contact   - Neck: small goiter  - Cardiovascular: regular, normal rate, nl s1 and s2, no m/r/g   - Respiratory: clear to auscultation bilaterally   - Integumentary: skin is normal, no edema  - Neurological: reflexes 2+ at biceps, (+) mild tremor  - Psychiatric: normal mood and affect    Data Reviewed:   Component      Latest Ref Rng & Units 6/2/2021 6/2/2021          11:19 AM 11:19 AM   T4, Free      0.82 - 1.77 ng/dL  1.88 (H)   TSH      0.450 - 4.500 uIU/mL 0.105 (L)      Assessment/Plan:     1. Acquired hypothyroidism: Was diagnosed with Hodgkin's at age 18 and received chemo and radiation. About 1-2 years after this diagnosis was found to have hypothyroidism and has been on treatment ever since. Initially was put on synthroid and continued having fatigue and the levels couldn't get regulated so she was changed to nature-throid and last year was on armour thyroid for a few months due to availability of nature-throid and then was put back on nature-throid and has remained on this dose. Was on 48.75 mg 2.5 tabs every morning at her first visit in 12/18. Tries to wait 30 min before food and coffee but sometimes coffee may be sooner. Does sometimes feel more tired after eating bread and did go one week with eliminating gluten about a year ago and did feel slightly better doing this. Checked her for celiac and this was normal.  TSH was 15 and TPO ab 14 in 12/18 and I had her try tirosint 100 mcg daily. TSH 18 in 2/19 so changed to unithroid 125 mcg daily and TSH 6 in 3/19 and increased dose to 137 mcg daily. Started the whole 30 diet in mid-April and followed for 30 days and TSH 2.23 in 5/19 so kept dose the same. TSH up to 2.66 in 12/19 but has coffee within 30 min 50% of the time so focused on waiting 30 min and kept dose the same. TSH up to 4.85 in 7/20 so increased to 150 mcg daily and TSH down to 0.55 in 10/20 and 0.65 in 11/20 so kept dose the same. TSH down to 0.105 and FT4 1.88 in 6/21 so decreased to 6.5 tabs/week. May have some gluten intolerance even though she doesn't have celiac that could be affecting her absorption of medication and since unithroid is gluten free and is finally regulating her levels, will have her stay on this brand.  - decrease unithroid 150 mcg to 6.5 tabs/week  - check TSH and free T4 prior to next visit       2. PCOS (polycystic ovarian syndrome): Her T was 20.1 in 7/20 but up to 29 in 10/20 and was not having her periods so started metformin  mg and her periods restarted within the next month but also stopped OCP at the same time.   We agreed to stop metformin for the next month and see if her periods continue on their own and they have so will stay off metformin and OCP for now. - check total testosterone prior to next visit  - stay off metformin   - A1c 5% in 11/20  - LDL 96 in 11/20     Patient Instructions   1) TSH is a thyroid test.  Your level is 0.105 which is low and below goal of 0.45-2.0. This test goes opposite of your thyroid dose and suggests your dose of unithroid is more than you need now that you have remained off the birth control pill. I will decrease your dose to 1 tab on Mon-Sat and 1/2 tab on Sun. I updated your med list but did not send a new prescription to your pharmacy on file that has been filling this med. 2) When your testosterone is back, I'll release it through [employer]. Follow-up and Dispositions    · Return in about 6 months (around 12/3/2021). Copy sent to:  Jose Jalloh MD as PCP - General (Internal Medicine)  Gracie Trevino NP (Nurse Practitioner)    Lab follow up: 6/5/21  Component      Latest Ref Rng & Units 6/2/2021          11:19 AM   Testosterone, Serum (Total)      10.0 - 55.0 ng/dL 24.4     Sent her the following message through [facility]:  Your testosterone is normal at 24.4 and at goal under 30 and down from 34 in October so I think it's fine to stay off the metformin and birth control as long as your periods are coming every month and we'll follow your levels over time.

## 2021-06-05 LAB
T4 FREE SERPL-MCNC: 1.88 NG/DL (ref 0.82–1.77)
TESTOST SERPL-MCNC: 24.4 NG/DL (ref 10–55)
TSH SERPL DL<=0.005 MIU/L-ACNC: 0.1 UIU/ML (ref 0.45–4.5)

## 2021-07-18 RX ORDER — LEVOTHYROXINE SODIUM 150 UG/1
TABLET ORAL
Qty: 90 TABLET | Refills: 3 | Status: SHIPPED | OUTPATIENT
Start: 2021-07-18 | End: 2022-02-15

## 2021-11-25 DIAGNOSIS — E28.2 PCOS (POLYCYSTIC OVARIAN SYNDROME): ICD-10-CM

## 2021-11-25 DIAGNOSIS — E03.9 ACQUIRED HYPOTHYROIDISM: ICD-10-CM

## 2021-12-10 LAB
T4 FREE SERPL-MCNC: 1.73 NG/DL (ref 0.82–1.77)
TESTOST SERPL-MCNC: 15.5 NG/DL (ref 10–55)
TSH SERPL DL<=0.005 MIU/L-ACNC: 0.6 UIU/ML (ref 0.45–4.5)

## 2021-12-13 ENCOUNTER — OFFICE VISIT (OUTPATIENT)
Dept: ENDOCRINOLOGY | Age: 31
End: 2021-12-13
Payer: COMMERCIAL

## 2021-12-13 DIAGNOSIS — E28.2 PCOS (POLYCYSTIC OVARIAN SYNDROME): ICD-10-CM

## 2021-12-13 DIAGNOSIS — E03.9 ACQUIRED HYPOTHYROIDISM: Primary | ICD-10-CM

## 2021-12-13 PROCEDURE — 99214 OFFICE O/P EST MOD 30 MIN: CPT | Performed by: INTERNAL MEDICINE

## 2021-12-13 NOTE — PROGRESS NOTES
Chief Complaint   Patient presents with    Thyroid Problem     PCP and Pharmacy verified     History of Present Illness: Elli Juan is a 36 y.o. female here for follow up of thyroid. Has been taking the unithroid 1 tab on Mon-Sat and 1/2 tab on Sun at least 30 min before food and coffee and hasn't missed any doses. Overall energy is pretty good but can have some fatigue. Bowels are regular. No heat or cold intolerance. Hair may be a little thinner possibly due to the change in seasons. Has some rosacea on her face. Periods are still regular. Weight is down 2 lbs. Not currently using any protection during sex. Her father who is an ER doctor in [employer] will be retiring in May 2022. Current Outpatient Medications   Medication Sig    Unithroid 150 mcg tablet TAKE 1 TABLET BY MOUTH ON MON-SAT AND 1/2 TAB ON SUN--BRAND MEDICALLY NECESSARY     No current facility-administered medications for this visit. No Known Allergies     Review of Systems: PER HPI    Physical Examination:  Blood pressure (P) 113/78, pulse (P) 82, height (P) 5' 4.5\" (1.638 m), weight (P) 131 lb 12.8 oz (59.8 kg). - General: pleasant, no distress, good eye contact   - Neck: small goiter  - Cardiovascular: regular, normal rate, nl s1 and s2, no m/r/g   - Respiratory: clear to auscultation bilaterally   - Integumentary: skin is normal, no edema  - Neurological: reflexes 2+ at biceps, no tremors  - Psychiatric: normal mood and affect    Data Reviewed:   Component      Latest Ref Rng & Units 12/7/2021 12/7/2021 12/7/2021           2:45 PM  2:45 PM  2:45 PM   T4, Free      0.82 - 1.77 ng/dL   1.73   TSH      0.450 - 4.500 uIU/mL  0.604    Testosterone, Serum (Total)      10.0 - 55.0 ng/dL 15.5         Assessment/Plan:     1. Acquired hypothyroidism: Was diagnosed with Hodgkin's at age 18 and received chemo and radiation.   About 1-2 years after this diagnosis was found to have hypothyroidism and has been on treatment ever since.   Initially was put on synthroid and continued having fatigue and the levels couldn't get regulated so she was changed to nature-throid and last year was on armour thyroid for a few months due to availability of nature-throid and then was put back on nature-throid and has remained on this dose. Was on 48.75 mg 2.5 tabs every morning at her first visit in 12/18. Tries to wait 30 min before food and coffee but sometimes coffee may be sooner. Does sometimes feel more tired after eating bread and did go one week with eliminating gluten about a year ago and did feel slightly better doing this. Checked her for celiac and this was normal.  TSH was 15 and TPO ab 14 in 12/18 and I had her try tirosint 100 mcg daily. TSH 18 in 2/19 so changed to unithroid 125 mcg daily and TSH 6 in 3/19 and increased dose to 137 mcg daily. Started the whole 30 diet in mid-April and followed for 30 days and TSH 2.23 in 5/19 so kept dose the same. TSH up to 2.66 in 12/19 but has coffee within 30 min 50% of the time so focused on waiting 30 min and kept dose the same. TSH up to 4.85 in 7/20 so increased to 150 mcg daily and TSH down to 0.55 in 10/20 and 0.65 in 11/20 so kept dose the same. TSH down to 0.105 and FT4 1.88 in 6/21 so decreased to 6.5 tabs/week and TSH 0.60 in 12/21 so kept dose the same. May have some gluten intolerance even though she doesn't have celiac that could be affecting her absorption of medication and since unithroid is gluten free and is finally regulating her levels, will have her stay on this brand. - cont unithroid 150 mcg 6.5 tabs/week  - check TSH and free T4 prior to next visit         2. PCOS (polycystic ovarian syndrome): Her T was 20.1 in 7/20 but up to 29 in 10/20 and was not having her periods so started metformin  mg and her periods restarted within the next month but also stopped OCP at the same time.   We agreed to stop metformin for the next month and see if her periods continue on their own and they have and her T was down to 24 in 6/21 so had her stay off metformin and OCP for now and T was 15 in 12/21  - check total testosterone prior to next visit  - stay off metformin   - A1c 5% in 11/20  - LDL 96 in 11/20     Patient Instructions   1) Your TSH (thyroid test) is perfect so I will keep your dose the same at 6.5 tabs/week. 2) I will plan on seeing you back in one year but if you feel you need to have your labs checked sooner, please let me know. 3) If you become pregnant in the future, be sure to take an extra tablet of unithroid on Saturday and an extra 1/2 tab on Sunday for a total of 8 tabs/week until you let me know and then we can repeat your labs. 4) Your testosterone is still well controlled off any medication so the PCOS is under good control. Follow-up and Dispositions    · Return in about 1 year (around 12/13/2022). Copy sent to:  Cole Carrasco MD (Obstetrics & Gynecology)        Addendum: 05/13/22    She let me know on 2/15/22 that she is currently 9 weeks pregnant and started taking 8 tab/week. Had labs at her OB's office on 5/6/22 that showed:  - TSH 1.65  - FT4 1.31  - FT3 2.2    Sent her the following message through [employer]:  TSH is a thyroid test.  Your level is 1.65 which is normal and at goal of 0.45-2.0. This test goes opposite of your thyroid dose and suggests your dose of unithroid is perfect so I will keep your dose the same. Please repeat your labs again at your 3rd trimester visit when you have your glucola test to screen for gestational diabetes and make sure I get a copy so I can determine if any changes are needed prior to delivery. I forwarded a copy of my note to your OB, Dr. Mikayla Vieyra. Addendum: 07/18/22  - TSH 8.68  - FT4 1.31    Sent her the following message through [employer]:  TSH is a thyroid test.  Your level is 8.68 which is high and above goal of 0.45-2.0.   This test goes opposite of your thyroid dose and suggests your dose of unithroid 8 tabs/week is possibly not enough or you have missed doses in the past 6 weeks or are not taking it properly. Please confirm if you have missed any doses or not in the past 6 weeks. Are you taking on an empty stomach with just water at least 30 min before food and coffee? Are you taking any vitamins within 4 hours of your pill? Are you on any new acid reflux medications or any other new medications? Let me know the answers to these questions and I'll determine if we need to make a dose change or not. Addendum: 07/18/22    We had the following [employer] exchange:    If that is the case, then let's focus on getting 8 tabs/week every week and if you miss a dose, double up the next day and move the vitamins 4 hours apart and wait 30 min to have coffee. Have you OB/GYN repeat your labs again in 4 weeks and send me the results. Thanks.  ===View-only below this line===      ----- Message -----       From:Olga Warren       Sent:7/18/2022 10:29 AM EDT         To:Trav Kamara MD    Subject:Thyroid levels     Hi Dr. Narinder Ramirez     [facility] you had a great vacation. Very possible I have missed doses/not taking properly. I do take vitamins shortly after my medicine - within an hour or so. I will wait longer now. I also have missed a few doses of the Saturday 2 tablet day and there are times where Ill have coffee shortly after, not the 30 min recommendation. No new medications though! The others could lead to my high numbers so I will work on that!      Thanks    [employer]

## 2021-12-13 NOTE — PATIENT INSTRUCTIONS
1) Your TSH (thyroid test) is perfect so I will keep your dose the same at 6.5 tabs/week. 2) I will plan on seeing you back in one year but if you feel you need to have your labs checked sooner, please let me know. 3) If you become pregnant in the future, be sure to take an extra tablet of unithroid on Saturday and an extra 1/2 tab on Sunday for a total of 8 tabs/week until you let me know and then we can repeat your labs. 4) Your testosterone is still well controlled off any medication so the PCOS is under good control.

## 2021-12-18 ENCOUNTER — OFFICE VISIT (OUTPATIENT)
Dept: URGENT CARE | Age: 31
End: 2021-12-18
Payer: COMMERCIAL

## 2021-12-18 VITALS — HEART RATE: 79 BPM | TEMPERATURE: 97.8 F | OXYGEN SATURATION: 99 % | RESPIRATION RATE: 18 BRPM

## 2021-12-18 DIAGNOSIS — Z20.822 EXPOSURE TO COVID-19 VIRUS: Primary | ICD-10-CM

## 2021-12-18 LAB — SARS-COV-2 POC: NEGATIVE

## 2021-12-18 PROCEDURE — 87426 SARSCOV CORONAVIRUS AG IA: CPT | Performed by: FAMILY MEDICINE

## 2021-12-18 PROCEDURE — 99202 OFFICE O/P NEW SF 15 MIN: CPT | Performed by: FAMILY MEDICINE

## 2021-12-18 NOTE — PROGRESS NOTES
This patient was seen at [address] [place] [address] Urgent Care while in their vehicle due to COVID-19 pandemic with PPE and focused examination in order to decrease community viral transmission. The patient/guardian gave verbal consent to treat. Lonnie Storey is a 36 y.o. female presenting to clinic today for COVID testing. She states that she was exposed to the virus on 2/14/21 through a family member. Denies any symptoms currently, states that she has been vaccinated for COVID x3. No other complaints today. The history is provided by the patient. History limited by: nothing. Past Medical History:   Diagnosis Date    Hodgkin's lymphoma [facility]) Age 18    s/p chemo and xrt    Hypothyroid 5/21/2015        Past Surgical History:   Procedure Laterality Date    HX OTHER SURGICAL      had some of the enlarged lymph nodes removed during time of port placement for chemo         Family History   Problem Relation Age of Onset    Thyroid Disease Mother         hypothyroidism    Cancer Father         sarcoma    Hypertension Father     Elevated Lipids Father     Cancer Maternal Grandfather         melanoma    Breast Cancer Paternal Grandmother     Pneumonia Paternal Grandmother     Coronary Art Dis Paternal Grandfather 72        Social History     Socioeconomic History    Marital status:      Spouse name: Not on file    Number of children: Not on file    Years of education: Not on file    Highest education level: Not on file   Occupational History    Not on file   Tobacco Use    Smoking status: Never Smoker    Smokeless tobacco: Never Used   Substance and Sexual Activity    Alcohol use: Yes     Alcohol/week: 0.0 standard drinks     Comment: very rarely    Drug use: No    Sexual activity: Not on file   Other Topics Concern    Not on file   Social History Narrative    Lives in [place] with .  in 2017. Works at [employer] as  for [employer].   Likes to run and hike. Ran and pole-vaulted in college for [employer] and [employer]. Social Determinants of Health     Financial Resource Strain:     Difficulty of Paying Living Expenses: Not on file   Food Insecurity:     Worried About Running Out of Food in the Last Year: Not on file    Elsy of Food in the Last Year: Not on file   Transportation Needs:     Lack of Transportation (Medical): Not on file    Lack of Transportation (Non-Medical): Not on file   Physical Activity:     Days of Exercise per Week: Not on file    Minutes of Exercise per Session: Not on file   Stress:     Feeling of Stress : Not on file   Social Connections:     Frequency of Communication with Friends and Family: Not on file    Frequency of Social Gatherings with Friends and Family: Not on file    Attends Hindu Services: Not on file    Active Member of [address] [place] [address] [employer] or Organizations: Not on file    Attends Club or Organization Meetings: Not on file    Marital Status: Not on file   Intimate Partner Violence:     Fear of Current or Ex-Partner: Not on file    Emotionally Abused: Not on file    Physically Abused: Not on file    Sexually Abused: Not on file   Housing Stability:     Unable to Pay for Housing in the Last Year: Not on file    Number of [place] in the Last Year: Not on file    Unstable Housing in the Last Year: Not on file                ALLERGIES: Patient has no known allergies. Review of Systems   Constitutional: Negative for chills and fever. HENT: Negative for congestion, rhinorrhea, sneezing and sore throat. Respiratory: Negative for cough, chest tightness, shortness of breath and wheezing. Cardiovascular: Negative for chest pain. Gastrointestinal: Negative for abdominal pain, nausea and vomiting. Vitals:    12/18/21 0944   Pulse: 79   Resp: 18   Temp: 97.8 °F (36.6 °C)   SpO2: 99%       Physical Exam  Vitals and nursing note reviewed. Constitutional:       General: She is not in acute distress. Appearance: Normal appearance. She is not ill-appearing, toxic-appearing or diaphoretic. HENT:      Head: Normocephalic and atraumatic. Eyes:      Conjunctiva/sclera: Conjunctivae normal.   Cardiovascular:      Rate and Rhythm: Normal rate. Pulmonary:      Effort: Pulmonary effort is normal. No respiratory distress. Comments: Speaking in complete sentences without difficulty. Neurological:      Mental Status: She is alert. Psychiatric:         Mood and Affect: Mood normal.         Behavior: Behavior normal.         MDM   Results for orders placed or performed in visit on 12/18/21   AMB POC SARS-COV-2   Result Value Ref Range    SARS-COV-2 POC Negative Negative       PLAN:  Patient presents to clinic today for COVID testing after exposure on 12/14/21. Asymptomatic, vaccinated x3.   1. Rapid COVID PCR negative. Quarantine per current CDC guidelines. 2. OTC for mild symptom management if symptoms develop. Increase fluid intake, ensure adequate nutritional intake. 3. Follow up with PCP as needed. 4. Go to ED with development of any acute symptoms. DIAGNOSIS:    ICD-10-CM ICD-9-CM    1. Exposure to COVID-19 virus  Z20.822 V01.79 AMB POC SARS-COV-2     No orders of the defined types were placed in this encounter.       Procedures

## 2022-02-15 RX ORDER — LEVOTHYROXINE SODIUM 150 UG/1
TABLET ORAL
Qty: 90 TABLET | Refills: 3
Start: 2022-02-15 | End: 2022-07-11 | Stop reason: SDUPTHER

## 2022-03-18 PROBLEM — E28.2 PCOS (POLYCYSTIC OVARIAN SYNDROME): Status: ACTIVE | Noted: 2020-10-23

## 2022-07-11 RX ORDER — LEVOTHYROXINE SODIUM 150 UG/1
TABLET ORAL
Qty: 105 TABLET | Refills: 3 | Status: SHIPPED | OUTPATIENT
Start: 2022-07-11

## 2022-11-28 DIAGNOSIS — E28.2 PCOS (POLYCYSTIC OVARIAN SYNDROME): ICD-10-CM

## 2022-11-28 DIAGNOSIS — E03.9 ACQUIRED HYPOTHYROIDISM: ICD-10-CM

## 2022-12-13 ENCOUNTER — OFFICE VISIT (OUTPATIENT)
Dept: ENDOCRINOLOGY | Age: 32
End: 2022-12-13
Payer: COMMERCIAL

## 2022-12-13 VITALS
DIASTOLIC BLOOD PRESSURE: 72 MMHG | SYSTOLIC BLOOD PRESSURE: 108 MMHG | HEART RATE: 72 BPM | BODY MASS INDEX: 23.66 KG/M2 | HEIGHT: 65 IN | WEIGHT: 142 LBS

## 2022-12-13 DIAGNOSIS — E03.9 ACQUIRED HYPOTHYROIDISM: Primary | ICD-10-CM

## 2022-12-13 DIAGNOSIS — E28.2 PCOS (POLYCYSTIC OVARIAN SYNDROME): ICD-10-CM

## 2022-12-13 PROCEDURE — 99214 OFFICE O/P EST MOD 30 MIN: CPT | Performed by: INTERNAL MEDICINE

## 2022-12-13 RX ORDER — LEVOTHYROXINE SODIUM 150 UG/1
TABLET ORAL
Qty: 105 TABLET | Refills: 3
Start: 2022-12-13

## 2022-12-13 NOTE — PATIENT INSTRUCTIONS
1) TSH is a thyroid test.  Your level is 0.10 which is low and below goal of 0.45-2.0. This test goes opposite of your thyroid dose and suggests your dose of unithroid is too much now that you have delivered. I will decrease your dose to 1 tab 7 days a week. I updated your med list but did not send a new prescription to your pharmacy on file that has been filling this med. Do your best to wait 30 min before food and coffee and take all vitamins and calcium at least 4 hours apart. 2) Your testosterone is 28 which is just above my goal of 30 or less for PCOS but we'll see what happens with your level over the next 6 months and hold on any metformin for now. 3) Repeat labs in 6 weeks and again prior to visit in 6 months.

## 2022-12-13 NOTE — PROGRESS NOTES
Chief Complaint   Patient presents with    Thyroid Problem     PCP and pharmacy confirmed      History of Present Illness: Mateus Culver is a 29 y.o. female here for follow up of thyroid. Her son, Matheus Vanessa, was born via  on 22. No complications at the end of pregnancy and currently is bottle feeding. Her weight was in around 150 at the time of delivery and is down to 142 today and was 131 at her visit in  prior to pregnancy. Still has been taking 8 tabs/week of unithroid though there are weeks she may only get 7 tabs. Tries to wait 30 min before food and coffee but may not always get always wait 30 min before coffee. She is still taking a pnv and usually this is about 2 hours apart and the calcium + D is usually at lunch or later. Did have a period last month that was longer and heavier than normal.  No chest pain or palpitations or tremors. Sometimes can feel hotter. Bowels are regular. Has had some hair loss. May have some fatigue at times. No anxiety. Some trouble staying asleep. 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 Current Outpatient Medications   Medication Sig    Unithroid 150 mcg tablet TAKE 1 TABLET BY MOUTH ON MON-SAT AND 2 TABS ON SUN--BRAND MEDICALLY NECESSARy    prenatal vit,itz 74/iron/folic (PRENATAL VITAMIN 1+1 PO) Take  by mouth daily. calcium carbonate/vitamin D3 (CALCIUM + D PO) Take  by mouth daily. No current facility-administered medications for this visit. No Known Allergies    Review of Systems: PER HPI    Physical Examination:  Blood pressure 108/72, pulse 72, height 5' 4.5\" (1.638 m), weight 142 lb (64.4 kg).   General: pleasant, no distress, good eye contact   Neck: small goiter  Cardiovascular: regular, normal rate, nl s1 and s2, no m/r/g   Respiratory: clear to auscultation bilaterally   Integumentary: skin is normal, no edema  Neurological: reflexes 2+ at biceps, very mild tremor  Psychiatric: normal mood and affect    Data Reviewed:   Component      Latest Ref Rng & Units 12/6/2022 12/6/2022 12/6/2022           9:49 AM  9:49 AM  9:49 AM   T4, Free      0.82 - 1.77 ng/dL   1.79 (H)   TSH      0.450 - 4.500 uIU/mL  0.101 (L)    Testosterone, Serum (Total)      10.0 - 55.0 ng/dL 32.8         Assessment/Plan:     1. Acquired hypothyroidism: Was diagnosed with Hodgkin's at age 18 and received chemo and radiation. About 1-2 years after this diagnosis was found to have hypothyroidism and has been on treatment ever since. Initially was put on synthroid and continued having fatigue and the levels couldn't get regulated so she was changed to nature-throid and last year was on armour thyroid for a few months due to availability of nature-throid and then was put back on nature-throid and has remained on this dose. Was on 48.75 mg 2.5 tabs every morning at her first visit in 12/18. Tries to wait 30 min before food and coffee but sometimes coffee may be sooner. Does sometimes feel more tired after eating bread and did go one week with eliminating gluten about a year ago and did feel slightly better doing this. Checked her for celiac and this was normal.  TSH was 15 and TPO ab 14 in 12/18 and I had her try tirosint 100 mcg daily. TSH 18 in 2/19 so changed to unithroid 125 mcg daily and TSH 6 in 3/19 and increased dose to 137 mcg daily. Started the whole 30 diet in mid-April and followed for 30 days and TSH 2.23 in 5/19 so kept dose the same. TSH up to 2.66 in 12/19 but has coffee within 30 min 50% of the time so focused on waiting 30 min and kept dose the same. TSH up to 4.85 in 7/20 so increased to 150 mcg daily and TSH down to 0.55 in 10/20 and 0.65 in 11/20 so kept dose the same. TSH down to 0.105 and FT4 1.88 in 6/21 so decreased to 6.5 tabs/week and TSH 0.60 in 12/21 so kept dose the same.   May have some gluten intolerance even though she doesn't have celiac that could be affecting her absorption of medication and since unithroid is gluten free and is finally regulating her levels, will have her stay on this brand. Became pregnant in 1/22 and increased her dose to 8 tabs/week and TSH 1.65 in 2/22 so kept dose the same. TSH 8.68 in 7/22 but had missed doses and not waiting 30 min before coffee and pnv was within an hour so kept dose the same. Delivered in 9/22 and TSH 0.10 and FT4 1.79 in 12/22 so decreased to 7 tabs/week. - decrease unithroid 150 mcg to 7 tabs/week  - check TSH and free T4 in 6 weeks and prior to next visit         2. PCOS (polycystic ovarian syndrome): Her T was 20.1 in 7/20 but up to 29 in 10/20 and was not having her periods so started metformin  mg and her periods restarted within the next month but also stopped OCP at the same time. We agreed to stop metformin for the next month and see if her periods continue on their own and they have and her T was down to 24 in 6/21 so had her stay off metformin and OCP for now and T was 15 in 12/21. Became pregnant in 1/22 and delivered in 9/22 and T up to 32 in 12/22. May need metformin again if level doesn't come under 30 in 6 months  - check total testosterone prior to next visit  - stay off metformin   - A1c 5% in 11/20  - LDL 96 in 11/20     Patient Instructions   1) TSH is a thyroid test.  Your level is 0.10 which is low and below goal of 0.45-2.0. This test goes opposite of your thyroid dose and suggests your dose of unithroid is too much now that you have delivered. I will decrease your dose to 1 tab 7 days a week. I updated your med list but did not send a new prescription to your pharmacy on file that has been filling this med. Do your best to wait 30 min before food and coffee and take all vitamins and calcium at least 4 hours apart.     2) Your testosterone is 28 which is just above my goal of 30 or less for PCOS but we'll see what happens with your level over the next 6 months and hold on any metformin for now. 3) Repeat labs in 6 weeks and again prior to visit in 6 months. Follow-up and Dispositions    Return in about 6 months (around 6/13/2023).                Copy sent to:  Radha Abebe MD (Obstetrics & Gynecology)  Av Clayton MD

## 2023-01-24 DIAGNOSIS — E03.9 ACQUIRED HYPOTHYROIDISM: ICD-10-CM

## 2023-01-24 DIAGNOSIS — E28.2 PCOS (POLYCYSTIC OVARIAN SYNDROME): ICD-10-CM

## 2023-04-22 DIAGNOSIS — E28.2 PCOS (POLYCYSTIC OVARIAN SYNDROME): Primary | ICD-10-CM

## 2023-04-23 DIAGNOSIS — E28.2 PCOS (POLYCYSTIC OVARIAN SYNDROME): Primary | ICD-10-CM

## 2023-06-01 DIAGNOSIS — E28.2 PCOS (POLYCYSTIC OVARIAN SYNDROME): ICD-10-CM

## 2023-06-01 DIAGNOSIS — E03.9 ACQUIRED HYPOTHYROIDISM: ICD-10-CM

## 2023-07-15 LAB
T4 FREE SERPL-MCNC: 1.48 NG/DL (ref 0.82–1.77)
TSH SERPL DL<=0.005 MIU/L-ACNC: 5.46 UIU/ML (ref 0.45–4.5)

## 2023-07-17 RX ORDER — LEVOTHYROXINE SODIUM 150 UG/1
150 TABLET ORAL DAILY
Qty: 90 TABLET | Refills: 3 | Status: SHIPPED | OUTPATIENT
Start: 2023-07-17 | End: 2023-07-21

## 2023-07-19 LAB — TESTOST SERPL-MCNC: 55.8 NG/DL (ref 10–55)

## 2023-07-21 ENCOUNTER — TELEMEDICINE (OUTPATIENT)
Age: 33
End: 2023-07-21
Payer: COMMERCIAL

## 2023-07-21 DIAGNOSIS — E03.9 ACQUIRED HYPOTHYROIDISM: Primary | ICD-10-CM

## 2023-07-21 DIAGNOSIS — E28.2 POLYCYSTIC OVARIAN SYNDROME: ICD-10-CM

## 2023-07-21 PROCEDURE — 99214 OFFICE O/P EST MOD 30 MIN: CPT | Performed by: INTERNAL MEDICINE

## 2023-07-21 RX ORDER — LEVOTHYROXINE SODIUM 150 UG/1
150 TABLET ORAL DAILY
Qty: 90 TABLET | Refills: 3
Start: 2023-07-21

## 2023-07-21 NOTE — PROGRESS NOTES
01:24 Called by nursing for bronchospasm.  Pt is a 74M with COPD who was admitted for UGIB who developed sore throat and rhonchi on exam per NP Trina at bedside.  CXR and RVP were performed; CXR was stable from admission and RVP negative.    Pt given Duonebs x1 with improvement of SOB and rhonchi.  Standing Duonebs started.  Initially had considered starting prednisone 40 QD for COPD exacerbation, but pt then with hematemesis (see below).  No h/o dysphagia but due to difficulty clearing throat, concern for aspiration and so S&S ordered.  As pt had also received multiple blood transfusions and 2L NS earlier in the day, will keep low threshold for possible fluid overload causing SOB; pt also with 1+ pitting pedal edema.  Last TTE performed 5/2016 with EF 83% and moderate LVH.  No prior h/o CHF.    Pt then developed hematemesis with dark blood and so was given Zofran 4 IVP x1 as admission EKG QTc 503.  Pt made NPO except meds and GI to be notified in the AM unless pt becomes unstable.    Pt otherwise remained VSS with HR 100s throughout.  CBC, CMP, Mg, Phos, coags, and BNP were sent.  VBG with H/H stable.      d/w Dr EDMUNDO Lima and NP Trina  -Karin Burns, PGY-3 [phone]  MAR [id number] 5.460 (H)   T4 Free      0.82 - 1.77 ng/dL  1.48    Testosterone      10.0 - 55.0 ng/dL 55.8 (H)         Assessment/Plan:     1. Acquired hypothyroidism: Was diagnosed with Hodgkin's at age 18 and received chemo and radiation. About 1-2 years after this diagnosis was found to have hypothyroidism and has been on treatment ever since. Initially was put on synthroid and continued having fatigue and the levels couldn't get regulated so she was changed to nature-throid and last year was on armour thyroid for a few months due to availability of nature-throid and then was put back on nature-throid and has remained on this dose. Was on 48.75 mg 2.5 tabs every morning at her first visit in 12/18. Tries to wait 30 min before food and coffee but sometimes coffee may be sooner. Does sometimes feel more tired after eating bread and did go one week with eliminating gluten about a year ago and did feel slightly better doing this. Checked her for celiac and this was normal.  TSH was 15 and TPO ab 14 in 12/18 and I had her try tirosint 100 mcg daily. TSH 18 in 2/19 so changed to unithroid 125 mcg daily and TSH 6 in 3/19 and increased dose to 137 mcg daily. Started the whole 30 diet in mid-April and followed for 30 days and TSH 2.23 in 5/19 so kept dose the same. TSH up to 2.66 in 12/19 but has coffee within 30 min 50% of the time so focused on waiting 30 min and kept dose the same. TSH up to 4.85 in 7/20 so increased to 150 mcg daily and TSH down to 0.55 in 10/20 and 0.65 in 11/20 so kept dose the same. TSH down to 0.105 and FT4 1.88 in 6/21 so decreased to 6.5 tabs/week and TSH 0.60 in 12/21 so kept dose the same. May have some gluten intolerance even though she doesn't have celiac that could be affecting her absorption of medication and since unithroid is gluten free and is finally regulating her levels, will have her stay on this brand.   Became pregnant in 1/22 and increased her dose to 8 tabs/week and TSH 1.65 in

## 2023-07-21 NOTE — PATIENT INSTRUCTIONS
1) TSH is a thyroid test.  Your level is 5.46 which is high and above goal of 0.45-2.0. The TSH goes opposite of your thyroid dose and suggests your dose of unithroid is not enough now that you are pregnant. I will increase your dose to 1 tab on Mon-Sat and 2 tabs on Sun. I updated your med list but did not send a new prescription to your pharmacy on file that has been filling this med. 2) Ask Luanne Santy Barajas if she can draw your labs in 4-6 weeks and if so, be sure that I receive a copy via fax and I will determine if you need a dose change and will then determine when you need labs next. If she prefers me to draw your labs, let me know this and I'll put an order on file at [address] [place] [address].    3) Please come for a follow up visit on 4/19/24 at 8:50am in our [place] office.

## 2023-07-31 LAB — PAP SMEAR, EXTERNAL: NORMAL

## 2023-08-07 LAB
ABO, EXTERNAL RESULT: NORMAL
C. TRACHOMATIS, EXTERNAL RESULT: NEGATIVE
HEP B, EXTERNAL RESULT: NEGATIVE
HEPATITIS C ANTIBODY, EXTERNAL RESULT: NEGATIVE
HIV, EXTERNAL RESULT: NON REACTIVE
N. GONORRHOEAE, EXTERNAL RESULT: NEGATIVE
RH FACTOR, EXTERNAL RESULT: POSITIVE
RPR, EXTERNAL RESULT: NEGATIVE
RUBELLA TITER, EXTERNAL RESULT: NORMAL
T. PALLIDUM (SYPHILIS) ANTIBODY, EXTERNAL RESULT: NEGATIVE

## 2023-11-13 ENCOUNTER — TELEPHONE (OUTPATIENT)
Age: 33
End: 2023-11-13

## 2023-11-13 NOTE — TELEPHONE ENCOUNTER
Informed pt Dr. Helena Whitley has sent a [employer] message to clarify dosing. Pt states she didn't realize and will check the message today.

## 2023-11-29 ENCOUNTER — INITIAL PRENATAL (OUTPATIENT)
Age: 33
End: 2023-11-29

## 2023-11-29 VITALS — SYSTOLIC BLOOD PRESSURE: 104 MMHG | WEIGHT: 147 LBS | BODY MASS INDEX: 24.84 KG/M2 | DIASTOLIC BLOOD PRESSURE: 74 MMHG

## 2023-11-29 DIAGNOSIS — Z34.80 ENCOUNTER FOR SUPERVISION OF OTHER NORMAL PREGNANCY, UNSPECIFIED TRIMESTER: Primary | ICD-10-CM

## 2023-11-29 DIAGNOSIS — Z3A.25 25 WEEKS GESTATION OF PREGNANCY: ICD-10-CM

## 2023-11-29 PROCEDURE — 0502F SUBSEQUENT PRENATAL CARE: CPT | Performed by: OBSTETRICS & GYNECOLOGY

## 2023-11-29 NOTE — PROGRESS NOTES
Obstetrical Transfer Note    Ms. Milly Palacio is a  (prior CS) 34 y.o. female White (non-) No LMP recorded. Patient is pregnant. .  She is transferring to our practice after receiving OB care at [facility]. Jose Luis Gamez \"Kyrie\" 8 months when she conceived     EDC dating:  Her due date has been determined by LMP and first trimester US. Today's US confirms reassuring fetal surveillance and her established EDC. Her prenatal care up to this point has been notable for Hypothyroidism. She is followed by Endocrine, Dr. Jazmyne Powell. Relevant past pregnancy history:  She has the following pregnancy history: Her last pregnancy was complicated by Breech fetal presentation  She has no history of  delivery. Relevant past medical history:(relevant to this pregnancy): Hypothyroidism   Hx of Hodgkin's       Pap/Occupational history:  Last pap smear: 2023: Normal      Substance history: negative for alcohol, tobacco and street drugs. Positive for nothing. Exposure history: There is/are no indoor cat/s in the home. The patient was instructed to not change the cat litter. She admits close contact with children on a regular basis. She has had chicken pox or the vaccine in the past.   Patient denies issues with domestic violence. Genetic Screening/Teratology Counseling: (Includes patient, baby's father, or anyone in either family with:)  3.  Patient's age >/= 28 at EDC?--no  2. Thalassemia ([place], [place], [facility] [address], or  background): MCV<80?--no.     3.  Neural tube defect (meningomyelocele, spina bifida, anencephaly)?--no.   4.  Congenital heart defect?--no.  5.  Down syndrome?--no.   6.  Brad-Sachs (Orthodoxy, [place])?--no.   7.  Canavan's Disease?--no.   8.  Familial Dysautonomia?--no.   9.  Sickle cell disease or trait ()? --no   The patient has not been tested for sickle trait  10. Hemophilia or other blood disorders?--no.    11.  Muscular

## 2024-01-02 ENCOUNTER — ROUTINE PRENATAL (OUTPATIENT)
Age: 34
End: 2024-01-02

## 2024-01-02 VITALS
SYSTOLIC BLOOD PRESSURE: 110 MMHG | BODY MASS INDEX: 25.02 KG/M2 | WEIGHT: 150.2 LBS | HEIGHT: 65 IN | DIASTOLIC BLOOD PRESSURE: 68 MMHG

## 2024-01-02 DIAGNOSIS — Z3A.25 25 WEEKS GESTATION OF PREGNANCY: Primary | ICD-10-CM

## 2024-01-02 PROCEDURE — 0502F SUBSEQUENT PRENATAL CARE: CPT | Performed by: OBSTETRICS & GYNECOLOGY

## 2024-01-02 NOTE — PROGRESS NOTES
Patient here for return OB visit at 30w2d. She reports no concerns.  She reports good fetal movement.   She denies vaginal bleeding, loss of fluid, abnormal vaginal discharge, UTI symptoms, cramping, or contractions.       /68 (Site: Left Upper Arm, Position: Sitting, Cuff Size: Medium Adult)   Ht 1.638 m (5' 4.5\")   Wt 68.1 kg (150 lb 3.2 oz)   BMI 25.38 kg/m²      Prenatal Fetal Information  Fetal HR: 136  Movement: Present      Patient Active Problem List    Diagnosis Date Noted    25 weeks gestation of pregnancy 2023     Primary Provider: Joel Chirinos for delivery    - h/o Primary LTCS, Breech presentation. 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   IOB labs: Pap Smear 23 NILM, Other labs wnl     Genetic Screening: MaT21+Core negative - BOY!, CF neg, SMA neg, AFP neg, OSBR 1:10,000.  Anatomy: Needed additional views. Follow-up normal.   3rd TM labs: GTT 94 Hgb___ Plts 11.3  Flu: given TDAP: 24   Rhogam: A POSITIVE     GBS:  Postpartum  -Breast/Bottle   -Circ  -Pap 2023, NILM      Problem List:  1) Hypothyroidism  - on Unithroid 200 mcg  - followed by Dr. Tim Ricci  2) Hodgkins Lymphoma, diagnosed @ 20 yo.   3) Short Interval Pregnancy  - conceived 9 months postpartum.   - her son turned 1 year 2023.        PCOS (polycystic ovarian syndrome) 10/23/2020    Hypothyroid 2015    Hodgkin's lymphoma (HCC)      s/p chemo and xrt           Return in about 2 weeks (around 2024), or please add growth to next apt for hypothyroidism.    Steph Chirinos MD

## 2024-01-09 ENCOUNTER — TELEPHONE (OUTPATIENT)
Age: 34
End: 2024-01-09

## 2024-01-09 NOTE — TELEPHONE ENCOUNTER
Patient called, name and  verified. Patient is calling c/o a sinus infection. She was calling to check on the medications safe to take as she had picked up Sudafed PE and Tylenol sinus and congestion. After looking these up on Up to Date...... since they both contain phenylephrine, up to date suggest not taking in pregnancy. I have informed the pt of such and have provided her with the list of safe medications that we provide our patient's. Please let me know if you would like to advise of something different. She is currently a  and 31w2d. Age is 33.

## 2024-01-16 ENCOUNTER — ROUTINE PRENATAL (OUTPATIENT)
Age: 34
End: 2024-01-16

## 2024-01-16 VITALS — SYSTOLIC BLOOD PRESSURE: 110 MMHG | WEIGHT: 149 LBS | BODY MASS INDEX: 25.18 KG/M2 | DIASTOLIC BLOOD PRESSURE: 62 MMHG

## 2024-01-16 DIAGNOSIS — Z3A.25 25 WEEKS GESTATION OF PREGNANCY: Primary | ICD-10-CM

## 2024-01-16 PROBLEM — Z34.90 PREGNANCY: Status: ACTIVE | Noted: 2023-11-29

## 2024-01-16 PROCEDURE — 0502F SUBSEQUENT PRENATAL CARE: CPT | Performed by: OBSTETRICS & GYNECOLOGY

## 2024-01-16 NOTE — PROGRESS NOTES
Patient here for return OB visit at 32w2d. She reports still dealing with constipation.   She reports good fetal movement.   She denies vaginal bleeding, loss of fluid, abnormal vaginal discharge, UTI symptoms, cramping, or contractions.     US today demonstrates the following - appropriate growth. Full report not yet in system secondary to power outage.     /62   Wt 67.6 kg (149 lb)   BMI 25.18 kg/m²          Patient Active Problem List    Diagnosis Date Noted    Pregnancy 2023     Primary Provider: Joel Chirinos for delivery    - h/o Primary LTCS, Breech presentation. \"Beau\"  EDC by: 1st trimester US at [facility]  Social:  for Track & Field team at &. Patient is a [employer].   IOB labs: Pap Smear 23 NILM, Other labs wnl. A positive   Genetic Screening: MaT21+Core low risk - BOY!, CF neg, SMA neg, AFP neg  Anatomy: Needed additional views. Follow-up normal. Anterior placenta   3rd TM labs: GTT 94 Hgb 11.3. Platelet: 404  Flu: given TDAP: 24   Rhogam: A POSITIVE  GBS:  Postpartum  -Breast/Bottle   -Circ  -Pap 2023, NILM      Problem List:  1) Acquired Hypothyroidism  - on Unithroid 200 mcg  - followed by Dr. Tim Ricci, endocrinology   -TFT's 23 normal   -After delivery: 1 tab 6 days a week & skip one day a week   2) Hx of Hodgkins Lymphoma, diagnosed @ 18 yo.   3) Short Interval Pregnancy  - conceived 9 months postpartum   - her son turned 1 2023  4)Prior LTCS + short interval pregnancy   -planning repeat CS         PCOS (polycystic ovarian syndrome) 10/23/2020    Hypothyroid 2015    Hodgkin's lymphoma (HCC)      s/p chemo and xrt           Return in about 2 weeks (around 2024), or Growth US in four.    Steph Chirinos MD

## 2024-01-30 ENCOUNTER — ROUTINE PRENATAL (OUTPATIENT)
Age: 34
End: 2024-01-30

## 2024-01-30 VITALS — SYSTOLIC BLOOD PRESSURE: 104 MMHG | BODY MASS INDEX: 25.69 KG/M2 | DIASTOLIC BLOOD PRESSURE: 74 MMHG | WEIGHT: 152 LBS

## 2024-01-30 DIAGNOSIS — Z34.90 PREGNANCY, UNSPECIFIED GESTATIONAL AGE: ICD-10-CM

## 2024-01-30 DIAGNOSIS — Z34.80 ENCOUNTER FOR SUPERVISION OF OTHER NORMAL PREGNANCY, UNSPECIFIED TRIMESTER: Primary | ICD-10-CM

## 2024-01-30 PROCEDURE — 0502F SUBSEQUENT PRENATAL CARE: CPT | Performed by: OBSTETRICS & GYNECOLOGY

## 2024-01-30 NOTE — PROGRESS NOTES
Patient here for return OB visit at 34w2d. She reports feeling well. No concerns.  She reports good fetal movement.   She denies vaginal bleeding, loss of fluid, abnormal vaginal discharge, UTI symptoms, cramping, or contractions.       /74   Wt 68.9 kg (152 lb)   BMI 25.69 kg/m²             Patient Active Problem List    Diagnosis Date Noted    Pregnancy 2023     Primary Provider: Joel Chirinos for delivery    - h/o Primary LTCS, Breech presentation. \"Beau\"  EDC by: 1st trimester US at 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  [place]   IOB labs: Pap Smear 23 NILM, Other labs wnl. A positive   Genetic Screening: MaT21+Core low risk - BOY! \"Eliseo\", CF neg, SMA neg, AFP neg  Anatomy: Needed additional views. Follow-up normal. Anterior placenta   3rd TM labs: GTT 94 Hgb 11.3. Platelet: 404  Flu: given TDAP: 24   Rhogam: A POSITIVE  GBS:  Postpartum Care:  -planning formula   -Desires circumcision    -Pap 2023, NILM      Problem List:  1) Acquired Hypothyroidism  - on Unithroid 200 mcg  - followed by Dr. Tim Ricci, endocrinology   -TFT's 23 normal   -After delivery: 1 tab 6 days a week & skip one day a week   2) Hx of Hodgkins Lymphoma, diagnosed @ 18 yo.   3) Short Interval Pregnancy  - conceived 9 months postpartum   - her son turned 1 2023  4)Prior LTCS + short interval pregnancy   -planning repeat CS         PCOS (polycystic ovarian syndrome) 10/23/2020    Hypothyroid 2015    Hodgkin's lymphoma (HCC)      s/p chemo and xrt           Return in about 1 week (around 2024).    Steph Chirinos MD

## 2024-02-13 ENCOUNTER — TELEPHONE (OUTPATIENT)
Age: 34
End: 2024-02-13

## 2024-02-13 ENCOUNTER — ROUTINE PRENATAL (OUTPATIENT)
Age: 34
End: 2024-02-13

## 2024-02-13 VITALS — DIASTOLIC BLOOD PRESSURE: 82 MMHG | WEIGHT: 155.2 LBS | BODY MASS INDEX: 26.23 KG/M2 | SYSTOLIC BLOOD PRESSURE: 122 MMHG

## 2024-02-13 DIAGNOSIS — Z34.83 PRENATAL CARE, SUBSEQUENT PREGNANCY IN THIRD TRIMESTER: Primary | ICD-10-CM

## 2024-02-13 PROCEDURE — 0502F SUBSEQUENT PRENATAL CARE: CPT | Performed by: OBSTETRICS & GYNECOLOGY

## 2024-02-13 NOTE — PROGRESS NOTES
Patient here for return OB visit at 36w2d. She reports feeling good.  She reports good fetal movement.  She denies vaginal bleeding, loss of fluid, abnormal vaginal discharge, UTI symptoms, cramping, or contractions.       /82   Wt 70.4 kg (155 lb 3.2 oz)   BMI 26.23 kg/m²             Patient Active Problem List    Diagnosis Date Noted    Pregnancy 2023     Primary Provider: Joel Chirinos for delivery    - h/o Primary LTCS, Breech presentation. 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  [place]   IOB labs: Pap Smear 23 NILM, Other labs wnl. A positive   Genetic Screening: MaT21+Core low risk - BOY! \"Eliseo\", CF neg, SMA neg, AFP neg  Anatomy: Needed additional views. Follow-up normal. Anterior placenta   3rd TM labs: GTT 94 Hgb 11.3. Platelet: 404  Flu: given TDAP: 24   Rhogam: A POSITIVE  GBS:  Postpartum Care:  -planning formula   -Desires circumcision    -Pap 2023, NILM      Problem List:  1) Acquired Hypothyroidism  - on Unithroid 200 mcg  - followed by Dr. Tim Ricci, endocrinology   -TFT's 23 normal   -After delivery: 1 tab 6 days a week & skip one day a week   2) Hx of Hodgkins Lymphoma, diagnosed @ 20 yo.   3) Short Interval Pregnancy  - conceived 9 months postpartum   - her son turned 1 2023  4)Prior LTCS + short interval pregnancy   -Repeat  schedule for 3/4 @0800        PCOS (polycystic ovarian syndrome) 10/23/2020    Hypothyroid 2015    Hodgkin's lymphoma (HCC)      s/p chemo and xrt           Return in about 1 week (around 2024), or Growth US and BLESSING Chirinos MD

## 2024-02-13 NOTE — TELEPHONE ENCOUNTER
Called and lvm informing the pt that we did get the ok to schedule her Growth US on 02/20 w/ . Pt is now scheduled for a 8am Growth US, followed by a OV visit at 8:15am w/  both on 02/20.

## 2024-02-20 ENCOUNTER — ROUTINE PRENATAL (OUTPATIENT)
Age: 34
End: 2024-02-20

## 2024-02-20 VITALS — DIASTOLIC BLOOD PRESSURE: 72 MMHG | BODY MASS INDEX: 26.19 KG/M2 | WEIGHT: 155 LBS | SYSTOLIC BLOOD PRESSURE: 110 MMHG

## 2024-02-20 DIAGNOSIS — Z34.83 PRENATAL CARE, SUBSEQUENT PREGNANCY IN THIRD TRIMESTER: Primary | ICD-10-CM

## 2024-02-20 PROCEDURE — 0502F SUBSEQUENT PRENATAL CARE: CPT | Performed by: OBSTETRICS & GYNECOLOGY

## 2024-02-20 NOTE — PROGRESS NOTES
Patient here for return OB visit at 37w2d. She reports feeling good.  She reports good fetal movement.   She denies vaginal bleeding, loss of fluid, abnormal vaginal discharge, UTI symptoms, cramping, or contractions.     GBS today     LIMITED OB SCAN A SINGLE VERTEX 37W2D IUP IS SEEN. FETAL CARDIAC MOTION OBSERVED. LIMITED ANATOMY WAS VISUALIZED AND APPEARS WNL. EFW= 6LB 6OZ (33 %) RAMA= 11.0CM PLACENTA APPEARS WNL.    /72   Wt 70.3 kg (155 lb)   BMI 26.19 kg/m²             Patient Active Problem List    Diagnosis Date Noted    Pregnancy 2023     Primary Provider: Joel Chirinos for delivery    - h/o Primary LTCS, Breech presentation. 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  [place]   IOB labs: Pap Smear 23 NILM, Other labs wnl. A positive   Genetic Screening: MaT21+Core low risk - BOY! \"Eliseo\", CF neg, SMA neg, AFP neg  Anatomy: Needed additional views. Follow-up normal. Anterior placenta   3rd TM labs: GTT 94 Hgb 11.3. Platelet: 404  Flu: given TDAP: 24   Rhogam: A POSITIVE  GBS:  Postpartum Care:  -planning formula   -Desires circumcision    -Pap 2023, NILM      Problem List:  1) Acquired Hypothyroidism  - on Unithroid 200 mcg  - followed by Dr. Tim Ricci, endocrinology   -TFT's 23 normal   -After delivery: 1 tab 6 days a week & skip one day a week   2) Hx of Hodgkins Lymphoma, diagnosed @ 18 yo.   3) Short Interval Pregnancy  - conceived 9 months postpartum   - her son turned 1 2023  4)Prior LTCS + short interval pregnancy   -Repeat  schedule for 3/4 @0800        PCOS (polycystic ovarian syndrome) 10/23/2020    Hypothyroid 2015    Hodgkin's lymphoma (HCC)      s/p chemo and xrt           Return in about 1 week (around 2024).    Steph Chirinos MD

## 2024-02-21 ENCOUNTER — ANESTHESIA EVENT (OUTPATIENT)
Facility: HOSPITAL | Age: 34
End: 2024-02-21
Payer: COMMERCIAL

## 2024-02-21 ENCOUNTER — HOSPITAL ENCOUNTER (INPATIENT)
Facility: HOSPITAL | Age: 34
LOS: 2 days | Discharge: HOME | End: 2024-02-23
Attending: OBSTETRICS & GYNECOLOGY | Admitting: OBSTETRICS & GYNECOLOGY
Payer: COMMERCIAL

## 2024-02-21 ENCOUNTER — ANESTHESIA (OUTPATIENT)
Facility: HOSPITAL | Age: 34
End: 2024-02-21
Payer: COMMERCIAL

## 2024-02-21 DIAGNOSIS — Z98.891 S/P CESAREAN SECTION: Primary | ICD-10-CM

## 2024-02-21 PROBLEM — O42.90 LEAKAGE OF AMNIOTIC FLUID: Status: ACTIVE | Noted: 2024-02-21

## 2024-02-21 LAB
ABO + RH BLD: NORMAL
BASOPHILS # BLD: 0.1 K/UL (ref 0–0.1)
BASOPHILS NFR BLD: 1 % (ref 0–1)
BLOOD GROUP ANTIBODIES SERPL: NORMAL
DIFFERENTIAL METHOD BLD: ABNORMAL
EOSINOPHIL # BLD: 0.1 K/UL (ref 0–0.4)
EOSINOPHIL NFR BLD: 1 % (ref 0–7)
ERYTHROCYTE [DISTWIDTH] IN BLOOD BY AUTOMATED COUNT: 12.8 % (ref 11.5–14.5)
HCT VFR BLD AUTO: 32.7 % (ref 35–47)
HGB BLD-MCNC: 11.1 G/DL (ref 11.5–16)
IMM GRANULOCYTES # BLD AUTO: 0.1 K/UL (ref 0–0.04)
IMM GRANULOCYTES NFR BLD AUTO: 1 % (ref 0–0.5)
LYMPHOCYTES # BLD: 2.1 K/UL (ref 0.8–3.5)
LYMPHOCYTES NFR BLD: 24 % (ref 12–49)
MCH RBC QN AUTO: 29.4 PG (ref 26–34)
MCHC RBC AUTO-ENTMCNC: 33.9 G/DL (ref 30–36.5)
MCV RBC AUTO: 86.5 FL (ref 80–99)
MONOCYTES # BLD: 0.8 K/UL (ref 0–1)
MONOCYTES NFR BLD: 9 % (ref 5–13)
NEUTS SEG # BLD: 5.8 K/UL (ref 1.8–8)
NEUTS SEG NFR BLD: 64 % (ref 32–75)
NRBC # BLD: 0 K/UL (ref 0–0.01)
NRBC BLD-RTO: 0 PER 100 WBC
PLATELET # BLD AUTO: 385 K/UL (ref 150–400)
PMV BLD AUTO: 10.2 FL (ref 8.9–12.9)
RBC # BLD AUTO: 3.78 M/UL (ref 3.8–5.2)
RPR SER QL: NONREACTIVE
SPECIMEN EXP DATE BLD: NORMAL
WBC # BLD AUTO: 8.9 K/UL (ref 3.6–11)

## 2024-02-21 PROCEDURE — 36415 COLL VENOUS BLD VENIPUNCTURE: CPT

## 2024-02-21 PROCEDURE — 3700000000 HC ANESTHESIA ATTENDED CARE: Performed by: OBSTETRICS & GYNECOLOGY

## 2024-02-21 PROCEDURE — 6360000002 HC RX W HCPCS: Performed by: NURSE ANESTHETIST, CERTIFIED REGISTERED

## 2024-02-21 PROCEDURE — 4500000002 HC ER NO CHARGE

## 2024-02-21 PROCEDURE — 6360000002 HC RX W HCPCS: Performed by: ADVANCED PRACTICE MIDWIFE

## 2024-02-21 PROCEDURE — 2580000003 HC RX 258: Performed by: ADVANCED PRACTICE MIDWIFE

## 2024-02-21 PROCEDURE — 2500000003 HC RX 250 WO HCPCS: Performed by: NURSE ANESTHETIST, CERTIFIED REGISTERED

## 2024-02-21 PROCEDURE — 85025 COMPLETE CBC W/AUTO DIFF WBC: CPT

## 2024-02-21 PROCEDURE — 3609079900 HC CESAREAN SECTION: Performed by: OBSTETRICS & GYNECOLOGY

## 2024-02-21 PROCEDURE — 2709999900 HC NON-CHARGEABLE SUPPLY: Performed by: OBSTETRICS & GYNECOLOGY

## 2024-02-21 PROCEDURE — 86850 RBC ANTIBODY SCREEN: CPT

## 2024-02-21 PROCEDURE — 6370000000 HC RX 637 (ALT 250 FOR IP): Performed by: OBSTETRICS & GYNECOLOGY

## 2024-02-21 PROCEDURE — 86592 SYPHILIS TEST NON-TREP QUAL: CPT

## 2024-02-21 PROCEDURE — 6360000002 HC RX W HCPCS: Performed by: OBSTETRICS & GYNECOLOGY

## 2024-02-21 PROCEDURE — 7100000000 HC PACU RECOVERY - FIRST 15 MIN: Performed by: OBSTETRICS & GYNECOLOGY

## 2024-02-21 PROCEDURE — 7100000001 HC PACU RECOVERY - ADDTL 15 MIN: Performed by: OBSTETRICS & GYNECOLOGY

## 2024-02-21 PROCEDURE — 1120000000 HC RM PRIVATE OB

## 2024-02-21 PROCEDURE — 59515 CESAREAN DELIVERY: CPT | Performed by: OBSTETRICS & GYNECOLOGY

## 2024-02-21 PROCEDURE — 99285 EMERGENCY DEPT VISIT HI MDM: CPT

## 2024-02-21 PROCEDURE — 6370000000 HC RX 637 (ALT 250 FOR IP): Performed by: ADVANCED PRACTICE MIDWIFE

## 2024-02-21 PROCEDURE — 2580000003 HC RX 258: Performed by: OBSTETRICS & GYNECOLOGY

## 2024-02-21 PROCEDURE — 3700000001 HC ADD 15 MINUTES (ANESTHESIA): Performed by: OBSTETRICS & GYNECOLOGY

## 2024-02-21 PROCEDURE — 6360000002 HC RX W HCPCS: Performed by: ANESTHESIOLOGY

## 2024-02-21 PROCEDURE — 86901 BLOOD TYPING SEROLOGIC RH(D): CPT

## 2024-02-21 PROCEDURE — A4216 STERILE WATER/SALINE, 10 ML: HCPCS | Performed by: ADVANCED PRACTICE MIDWIFE

## 2024-02-21 PROCEDURE — 86900 BLOOD TYPING SEROLOGIC ABO: CPT

## 2024-02-21 PROCEDURE — 2580000003 HC RX 258: Performed by: NURSE ANESTHETIST, CERTIFIED REGISTERED

## 2024-02-21 PROCEDURE — 2500000003 HC RX 250 WO HCPCS: Performed by: ADVANCED PRACTICE MIDWIFE

## 2024-02-21 RX ORDER — OXYCODONE HYDROCHLORIDE 5 MG/1
10 TABLET ORAL EVERY 4 HOURS PRN
Status: DISCONTINUED | OUTPATIENT
Start: 2024-02-22 | End: 2024-02-23 | Stop reason: HOSPADM

## 2024-02-21 RX ORDER — OXYCODONE HYDROCHLORIDE 5 MG/1
5 TABLET ORAL EVERY 4 HOURS PRN
Status: DISPENSED | OUTPATIENT
Start: 2024-02-21 | End: 2024-02-22

## 2024-02-21 RX ORDER — ACETAMINOPHEN 325 MG/1
975 TABLET ORAL ONCE
Status: COMPLETED | OUTPATIENT
Start: 2024-02-21 | End: 2024-02-21

## 2024-02-21 RX ORDER — DOCUSATE SODIUM 100 MG/1
100 CAPSULE, LIQUID FILLED ORAL 2 TIMES DAILY
Status: DISCONTINUED | OUTPATIENT
Start: 2024-02-21 | End: 2024-02-23 | Stop reason: HOSPADM

## 2024-02-21 RX ORDER — DOCUSATE SODIUM 100 MG/1
100 CAPSULE, LIQUID FILLED ORAL 2 TIMES DAILY PRN
Status: DISCONTINUED | OUTPATIENT
Start: 2024-02-21 | End: 2024-02-21

## 2024-02-21 RX ORDER — ONDANSETRON 4 MG/1
4 TABLET, ORALLY DISINTEGRATING ORAL EVERY 8 HOURS PRN
Status: DISCONTINUED | OUTPATIENT
Start: 2024-02-21 | End: 2024-02-23 | Stop reason: HOSPADM

## 2024-02-21 RX ORDER — ACETAMINOPHEN 325 MG/1
650 TABLET ORAL EVERY 6 HOURS
Status: ACTIVE | OUTPATIENT
Start: 2024-02-21 | End: 2024-02-22

## 2024-02-21 RX ORDER — SODIUM CHLORIDE 0.9 % (FLUSH) 0.9 %
5-40 SYRINGE (ML) INJECTION PRN
Status: DISCONTINUED | OUTPATIENT
Start: 2024-02-21 | End: 2024-02-23 | Stop reason: HOSPADM

## 2024-02-21 RX ORDER — SODIUM CHLORIDE 9 MG/ML
INJECTION, SOLUTION INTRAVENOUS PRN
Status: DISCONTINUED | OUTPATIENT
Start: 2024-02-21 | End: 2024-02-23 | Stop reason: HOSPADM

## 2024-02-21 RX ORDER — BISACODYL 5 MG/1
5 TABLET, DELAYED RELEASE ORAL DAILY
Status: DISCONTINUED | OUTPATIENT
Start: 2024-02-21 | End: 2024-02-23 | Stop reason: HOSPADM

## 2024-02-21 RX ORDER — OXYCODONE HYDROCHLORIDE 5 MG/1
5 TABLET ORAL EVERY 4 HOURS PRN
Status: DISCONTINUED | OUTPATIENT
Start: 2024-02-22 | End: 2024-02-23 | Stop reason: HOSPADM

## 2024-02-21 RX ORDER — HYDROMORPHONE HYDROCHLORIDE 1 MG/ML
0.25 INJECTION, SOLUTION INTRAMUSCULAR; INTRAVENOUS; SUBCUTANEOUS
Status: DISCONTINUED | OUTPATIENT
Start: 2024-02-21 | End: 2024-02-23 | Stop reason: HOSPADM

## 2024-02-21 RX ORDER — ACETAMINOPHEN 500 MG
1000 TABLET ORAL EVERY 6 HOURS SCHEDULED
Status: DISCONTINUED | OUTPATIENT
Start: 2024-02-22 | End: 2024-02-23 | Stop reason: HOSPADM

## 2024-02-21 RX ORDER — HYDROMORPHONE HYDROCHLORIDE 1 MG/ML
0.25 INJECTION, SOLUTION INTRAMUSCULAR; INTRAVENOUS; SUBCUTANEOUS EVERY 6 HOURS PRN
Status: ACTIVE | OUTPATIENT
Start: 2024-02-21 | End: 2024-02-22

## 2024-02-21 RX ORDER — MODIFIED LANOLIN
OINTMENT (GRAM) TOPICAL
Status: DISCONTINUED | OUTPATIENT
Start: 2024-02-21 | End: 2024-02-23 | Stop reason: HOSPADM

## 2024-02-21 RX ORDER — EPHEDRINE SULFATE 50 MG/ML
INJECTION INTRAVENOUS PRN
Status: DISCONTINUED | OUTPATIENT
Start: 2024-02-21 | End: 2024-02-21 | Stop reason: SDUPTHER

## 2024-02-21 RX ORDER — TERBUTALINE SULFATE 1 MG/ML
0.25 INJECTION, SOLUTION SUBCUTANEOUS
Status: DISCONTINUED | OUTPATIENT
Start: 2024-02-21 | End: 2024-02-21 | Stop reason: HOSPADM

## 2024-02-21 RX ORDER — MISOPROSTOL 200 UG/1
800 TABLET ORAL PRN
Status: DISCONTINUED | OUTPATIENT
Start: 2024-02-21 | End: 2024-02-23 | Stop reason: HOSPADM

## 2024-02-21 RX ORDER — PHENYLEPHRINE HCL IN 0.9% NACL 0.4MG/10ML
SYRINGE (ML) INTRAVENOUS PRN
Status: DISCONTINUED | OUTPATIENT
Start: 2024-02-21 | End: 2024-02-21 | Stop reason: SDUPTHER

## 2024-02-21 RX ORDER — SODIUM CHLORIDE 0.9 % (FLUSH) 0.9 %
5-40 SYRINGE (ML) INJECTION EVERY 12 HOURS SCHEDULED
Status: DISCONTINUED | OUTPATIENT
Start: 2024-02-21 | End: 2024-02-23 | Stop reason: HOSPADM

## 2024-02-21 RX ORDER — DIPHENHYDRAMINE HCL 25 MG
25 CAPSULE ORAL EVERY 6 HOURS PRN
Status: DISCONTINUED | OUTPATIENT
Start: 2024-02-21 | End: 2024-02-23 | Stop reason: HOSPADM

## 2024-02-21 RX ORDER — SODIUM CHLORIDE, SODIUM LACTATE, POTASSIUM CHLORIDE, CALCIUM CHLORIDE 600; 310; 30; 20 MG/100ML; MG/100ML; MG/100ML; MG/100ML
INJECTION, SOLUTION INTRAVENOUS CONTINUOUS
Status: DISCONTINUED | OUTPATIENT
Start: 2024-02-21 | End: 2024-02-23 | Stop reason: HOSPADM

## 2024-02-21 RX ORDER — POLYETHYLENE GLYCOL 3350 17 G/17G
17 POWDER, FOR SOLUTION ORAL DAILY PRN
Status: DISCONTINUED | OUTPATIENT
Start: 2024-02-21 | End: 2024-02-21

## 2024-02-21 RX ORDER — IBUPROFEN 400 MG/1
800 TABLET ORAL EVERY 8 HOURS
Status: DISCONTINUED | OUTPATIENT
Start: 2024-02-21 | End: 2024-02-23 | Stop reason: HOSPADM

## 2024-02-21 RX ORDER — NALOXONE HYDROCHLORIDE 0.4 MG/ML
INJECTION, SOLUTION INTRAMUSCULAR; INTRAVENOUS; SUBCUTANEOUS PRN
Status: ACTIVE | OUTPATIENT
Start: 2024-02-21 | End: 2024-02-22

## 2024-02-21 RX ORDER — SWAB
1 SWAB, NON-MEDICATED MISCELLANEOUS DAILY
Status: DISCONTINUED | OUTPATIENT
Start: 2024-02-21 | End: 2024-02-23 | Stop reason: HOSPADM

## 2024-02-21 RX ORDER — SODIUM CHLORIDE, SODIUM LACTATE, POTASSIUM CHLORIDE, CALCIUM CHLORIDE 600; 310; 30; 20 MG/100ML; MG/100ML; MG/100ML; MG/100ML
INJECTION, SOLUTION INTRAVENOUS CONTINUOUS
Status: DISPENSED | OUTPATIENT
Start: 2024-02-21 | End: 2024-02-21

## 2024-02-21 RX ORDER — ONDANSETRON 2 MG/ML
INJECTION INTRAMUSCULAR; INTRAVENOUS PRN
Status: DISCONTINUED | OUTPATIENT
Start: 2024-02-21 | End: 2024-02-21 | Stop reason: SDUPTHER

## 2024-02-21 RX ORDER — SODIUM CHLORIDE, SODIUM LACTATE, POTASSIUM CHLORIDE, AND CALCIUM CHLORIDE .6; .31; .03; .02 G/100ML; G/100ML; G/100ML; G/100ML
1000 INJECTION, SOLUTION INTRAVENOUS PRN
Status: DISCONTINUED | OUTPATIENT
Start: 2024-02-21 | End: 2024-02-21 | Stop reason: HOSPADM

## 2024-02-21 RX ORDER — SODIUM CHLORIDE, SODIUM LACTATE, POTASSIUM CHLORIDE, AND CALCIUM CHLORIDE .6; .31; .03; .02 G/100ML; G/100ML; G/100ML; G/100ML
500 INJECTION, SOLUTION INTRAVENOUS PRN
Status: DISCONTINUED | OUTPATIENT
Start: 2024-02-21 | End: 2024-02-21 | Stop reason: HOSPADM

## 2024-02-21 RX ORDER — FENTANYL CITRATE 50 UG/ML
INJECTION, SOLUTION INTRAMUSCULAR; INTRAVENOUS
Status: COMPLETED | OUTPATIENT
Start: 2024-02-21 | End: 2024-02-21

## 2024-02-21 RX ORDER — SODIUM CHLORIDE 9 MG/ML
25 INJECTION, SOLUTION INTRAVENOUS PRN
Status: DISCONTINUED | OUTPATIENT
Start: 2024-02-21 | End: 2024-02-21 | Stop reason: HOSPADM

## 2024-02-21 RX ORDER — SODIUM CHLORIDE 0.9 % (FLUSH) 0.9 %
5-40 SYRINGE (ML) INJECTION PRN
Status: DISCONTINUED | OUTPATIENT
Start: 2024-02-21 | End: 2024-02-21 | Stop reason: HOSPADM

## 2024-02-21 RX ORDER — KETOROLAC TROMETHAMINE 30 MG/ML
30 INJECTION, SOLUTION INTRAMUSCULAR; INTRAVENOUS EVERY 6 HOURS
Status: DISPENSED | OUTPATIENT
Start: 2024-02-21 | End: 2024-02-22

## 2024-02-21 RX ORDER — DIPHENHYDRAMINE HYDROCHLORIDE 50 MG/ML
12.5 INJECTION INTRAMUSCULAR; INTRAVENOUS EVERY 4 HOURS PRN
Status: DISCONTINUED | OUTPATIENT
Start: 2024-02-21 | End: 2024-02-23 | Stop reason: HOSPADM

## 2024-02-21 RX ORDER — KETOROLAC TROMETHAMINE 30 MG/ML
INJECTION, SOLUTION INTRAMUSCULAR; INTRAVENOUS PRN
Status: DISCONTINUED | OUTPATIENT
Start: 2024-02-21 | End: 2024-02-21 | Stop reason: SDUPTHER

## 2024-02-21 RX ORDER — ONDANSETRON 2 MG/ML
4 INJECTION INTRAMUSCULAR; INTRAVENOUS EVERY 6 HOURS PRN
Status: ACTIVE | OUTPATIENT
Start: 2024-02-21 | End: 2024-02-22

## 2024-02-21 RX ORDER — MORPHINE SULFATE 1 MG/ML
INJECTION, SOLUTION EPIDURAL; INTRATHECAL; INTRAVENOUS
Status: COMPLETED | OUTPATIENT
Start: 2024-02-21 | End: 2024-02-21

## 2024-02-21 RX ORDER — BUPIVACAINE HYDROCHLORIDE 7.5 MG/ML
INJECTION, SOLUTION EPIDURAL; RETROBULBAR
Status: COMPLETED | OUTPATIENT
Start: 2024-02-21 | End: 2024-02-21

## 2024-02-21 RX ORDER — POLYETHYLENE GLYCOL 3350 17 G/17G
17 POWDER, FOR SOLUTION ORAL DAILY
Status: DISCONTINUED | OUTPATIENT
Start: 2024-02-21 | End: 2024-02-23 | Stop reason: HOSPADM

## 2024-02-21 RX ORDER — ONDANSETRON 2 MG/ML
4 INJECTION INTRAMUSCULAR; INTRAVENOUS EVERY 4 HOURS PRN
Status: DISCONTINUED | OUTPATIENT
Start: 2024-02-21 | End: 2024-02-21 | Stop reason: HOSPADM

## 2024-02-21 RX ORDER — SODIUM CHLORIDE 0.9 % (FLUSH) 0.9 %
5-40 SYRINGE (ML) INJECTION EVERY 12 HOURS SCHEDULED
Status: DISCONTINUED | OUTPATIENT
Start: 2024-02-21 | End: 2024-02-21 | Stop reason: HOSPADM

## 2024-02-21 RX ORDER — SIMETHICONE 80 MG
80 TABLET,CHEWABLE ORAL EVERY 6 HOURS PRN
Status: DISCONTINUED | OUTPATIENT
Start: 2024-02-21 | End: 2024-02-23 | Stop reason: HOSPADM

## 2024-02-21 RX ORDER — ONDANSETRON 2 MG/ML
4 INJECTION INTRAMUSCULAR; INTRAVENOUS EVERY 6 HOURS PRN
Status: DISCONTINUED | OUTPATIENT
Start: 2024-02-21 | End: 2024-02-23 | Stop reason: HOSPADM

## 2024-02-21 RX ORDER — PROMETHAZINE HYDROCHLORIDE 25 MG/1
25 TABLET ORAL EVERY 6 HOURS PRN
Status: DISCONTINUED | OUTPATIENT
Start: 2024-02-21 | End: 2024-02-23 | Stop reason: HOSPADM

## 2024-02-21 RX ADMIN — ONDANSETRON HYDROCHLORIDE 4 MG: 2 INJECTION, SOLUTION INTRAMUSCULAR; INTRAVENOUS at 08:40

## 2024-02-21 RX ADMIN — Medication 120 MCG: at 08:32

## 2024-02-21 RX ADMIN — Medication 80 MCG: at 08:46

## 2024-02-21 RX ADMIN — SODIUM CHLORIDE, POTASSIUM CHLORIDE, SODIUM LACTATE AND CALCIUM CHLORIDE: 600; 310; 30; 20 INJECTION, SOLUTION INTRAVENOUS at 21:32

## 2024-02-21 RX ADMIN — WATER 2000 MG: 1 INJECTION INTRAMUSCULAR; INTRAVENOUS; SUBCUTANEOUS at 08:19

## 2024-02-21 RX ADMIN — Medication 80 MCG: at 08:40

## 2024-02-21 RX ADMIN — SODIUM CHLORIDE, POTASSIUM CHLORIDE, SODIUM LACTATE AND CALCIUM CHLORIDE: 600; 310; 30; 20 INJECTION, SOLUTION INTRAVENOUS at 14:09

## 2024-02-21 RX ADMIN — PHENYLEPHRINE HYDROCHLORIDE 40 MCG/MIN: 10 INJECTION INTRAVENOUS at 08:32

## 2024-02-21 RX ADMIN — Medication 909 ML/HR: at 09:03

## 2024-02-21 RX ADMIN — SODIUM CHLORIDE, POTASSIUM CHLORIDE, SODIUM LACTATE AND CALCIUM CHLORIDE: 600; 310; 30; 20 INJECTION, SOLUTION INTRAVENOUS at 08:23

## 2024-02-21 RX ADMIN — MORPHINE SULFATE 0.15 MG: 1 INJECTION EPIDURAL; INTRATHECAL; INTRAVENOUS at 08:33

## 2024-02-21 RX ADMIN — KETOROLAC TROMETHAMINE 30 MG: 30 INJECTION, SOLUTION INTRAMUSCULAR; INTRAVENOUS at 23:59

## 2024-02-21 RX ADMIN — BUPIVACAINE HYDROCHLORIDE 11 MG: 7.5 INJECTION, SOLUTION EPIDURAL; RETROBULBAR at 08:33

## 2024-02-21 RX ADMIN — SODIUM CHLORIDE, POTASSIUM CHLORIDE, SODIUM LACTATE AND CALCIUM CHLORIDE 1000 ML: 600; 310; 30; 20 INJECTION, SOLUTION INTRAVENOUS at 07:21

## 2024-02-21 RX ADMIN — EPHEDRINE SULFATE 10 MG: 50 INJECTION INTRAVENOUS at 09:58

## 2024-02-21 RX ADMIN — FAMOTIDINE 20 MG: 10 INJECTION, SOLUTION INTRAVENOUS at 08:20

## 2024-02-21 RX ADMIN — BISACODYL 5 MG: 5 TABLET, COATED ORAL at 11:24

## 2024-02-21 RX ADMIN — PROMETHAZINE HYDROCHLORIDE 25 MG: 25 TABLET ORAL at 14:06

## 2024-02-21 RX ADMIN — DIPHENHYDRAMINE HYDROCHLORIDE 12.5 MG: 50 INJECTION INTRAMUSCULAR; INTRAVENOUS at 20:25

## 2024-02-21 RX ADMIN — ACETAMINOPHEN 975 MG: 325 TABLET ORAL at 08:18

## 2024-02-21 RX ADMIN — AZITHROMYCIN MONOHYDRATE 500 MG: 500 INJECTION, POWDER, LYOPHILIZED, FOR SOLUTION INTRAVENOUS at 08:23

## 2024-02-21 RX ADMIN — FENTANYL CITRATE 15 MCG: 50 INJECTION, SOLUTION INTRAMUSCULAR; INTRAVENOUS at 08:33

## 2024-02-21 RX ADMIN — EPHEDRINE SULFATE 10 MG: 50 INJECTION INTRAVENOUS at 08:53

## 2024-02-21 RX ADMIN — ONDANSETRON 4 MG: 2 INJECTION INTRAMUSCULAR; INTRAVENOUS at 11:52

## 2024-02-21 RX ADMIN — KETOROLAC TROMETHAMINE 30 MG: 30 INJECTION, SOLUTION INTRAMUSCULAR; INTRAVENOUS at 18:02

## 2024-02-21 RX ADMIN — KETOROLAC TROMETHAMINE 30 MG: 30 INJECTION, SOLUTION INTRAMUSCULAR; INTRAVENOUS at 09:47

## 2024-02-21 ASSESSMENT — PAIN SCALES - GENERAL
PAINLEVEL_OUTOF10: 1
PAINLEVEL_OUTOF10: 4

## 2024-02-21 ASSESSMENT — PAIN DESCRIPTION - ORIENTATION: ORIENTATION: LOWER

## 2024-02-21 ASSESSMENT — PAIN - FUNCTIONAL ASSESSMENT
PAIN_FUNCTIONAL_ASSESSMENT: ACTIVITIES ARE NOT PREVENTED
PAIN_FUNCTIONAL_ASSESSMENT: ACTIVITIES ARE NOT PREVENTED

## 2024-02-21 ASSESSMENT — PAIN DESCRIPTION - DESCRIPTORS: DESCRIPTORS: THROBBING

## 2024-02-21 ASSESSMENT — PAIN DESCRIPTION - LOCATION: LOCATION: INCISION

## 2024-02-21 NOTE — ANESTHESIA POSTPROCEDURE EVALUATION
Post-Anesthesia Evaluation and Assessment    Patient: Christen Lee MRN: [id number]  SSN: [id number]    YOB: 1990  Age: 33 y.o.  Sex: female      I have evaluated the patient and they are stable and ready for discharge from the PACU.     Cardiovascular Function/Vital Signs  Visit Vitals  BP (!) 103/56   Pulse 82   Temp 97.7 °F (36.5 °C)   Resp 16   SpO2 99%       Patient is status post Spinal anesthesia for Procedure(s):   SECTION.    Nausea/Vomiting: None    Postoperative hydration reviewed and adequate.    Pain:      Managed    Neurological Status:       At baseline    Mental Status, Level of Consciousness: Alert and  oriented to person, place, and time    Pulmonary Status:       Adequate oxygenation and airway patent    Complications related to anesthesia: None    Post-anesthesia assessment completed. No concerns    Signed By: Basim Fuentes MD     2024            Department of Anesthesiology  Postprocedure Note    Patient: Christen Lee  MRN: [id number]  YOB: 1990  Date of evaluation: 2024    Procedure Summary       Date: 24 Room / Location: [facility] L&D OR    Anesthesia Start: 823 Anesthesia Stop:     Procedures:        SECTION (Abdomen)      Labor Analgesia Diagnosis:       Delivery by  section using transverse incision of lower segment of uterus      (Delivery by  section using transverse incision of lower segment of uterus [O82])    Surgeons: Steph Chirinos MD Responsible Provider: Basim Fuentes MD    Anesthesia Type: Spinal ASA Status: 2            Anesthesia Type: Spinal    Vanessa Phase I:      Vanessa Phase II:      Anesthesia Post Evaluation    No notable events documented.

## 2024-02-21 NOTE — PROGRESS NOTES
~0225: Brief report received from TIM Wahl RN,  Care of the patient is assumed.  ~0240: MANUEL Calero CNM is called and informed of the patient's arrival. Brief report is given.  ~0245: MANUEL Calero CNM is at the bedside speaking with the patient.  SVE done; closed.  MANUEL Calero CNM is discussing the plan of care.  ~0315: External monitors are removed in preparation for transfer.   ~0318: The patient is transferred ambulatory to &D .

## 2024-02-21 NOTE — BRIEF OP NOTE
Brief Postoperative Note      Patient: Christen Lee  YOB: 1990  MRN: [id number]    Date of Procedure: 2024    Pre-Op Diagnosis Codes:  1)  at 37w3d   2) Prior CS x1  3) PROM     Post-Op Diagnosis: Same       Procedure(s):  REPEAT LOW TRANSVERSE  SECTION    Surgeon(s):  Steph Chirinos MD    Assistant:  First Assistant: Luisa Gonzalez RN    Anesthesia: Spinal    Estimated Blood Loss (mL): 600    Complications: None    Specimens:   NONE     Implants:  NONE      Drains:   Urinary Catheter 24 Baldwin (Active)       Findings: OLD SKIN INCISION APPEARED TO HAVE A KELOID. MINIMAL CLEAR AMNIOTIC FLUID. RIGHT RECTUS MUSCLE DISPLACED  LATERALLY. LIVEBORN MALE  IN THE VERTEX PRESENTATION. DIFFICULT FETAL EXTRACTION REQUIRING ONE PULL OF THE KIWI VACUUM. DENSE SCARING AT THE LEVEL OF THE FASCIA.       Electronically signed by Steph Chirinos MD on 2024 at 9:58 AM

## 2024-02-21 NOTE — H&P
Department of Obstetrics and Gynecology  Nurse Practitioner Inpatient Obstetrics History and Physical           CHIEF COMPLAINT:  leakage of amniotic fluid     HISTORY OF PRESENT ILLNESS:       The patient is a 33 y.o.  2 parity 1001 at 37w3d with an RESHMA of 3/10/24.  Patient presents to l and d for PROM and repeat . Pt was seen in the DANA for PROM at 0030 this morning. Reports a large gush of clear fluid and continued leaking and gushing since then. Wore a diaper into DANA and it is saturated. Denies VB and ctx. Endorses good fetal movement.      PRENATAL CARE:     Primary Provider: Joel Chirinos for delivery    - h/o Primary LTCS, Breech presentation. 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  [place]   IOB labs: Pap Smear 23 NILM, Other labs wnl. A positive   Genetic Screening: MaT21+Core low risk - BOY! \"Eliseo\", CF neg, SMA neg, AFP neg  Anatomy: Needed additional views. Follow-up normal. Anterior placenta   3rd TM labs: GTT 94 Hgb 11.3. Platelet: 404  Flu: given TDAP: 24   Rhogam: A POSITIVE  GBS:  Postpartum Care:  -planning formula   -Desires circumcision    -Pap 2023, NILM        Problem List:  1) Acquired Hypothyroidism  - on Unithroid 200 mcg  - followed by Dr. Tim Ricci, endocrinology   -TFT's 23 normal   -After delivery: 1 tab 6 days a week & skip one day a week   2) Hx of Hodgkins Lymphoma, diagnosed @ 18 yo.   3) Short Interval Pregnancy  - conceived 9 months postpartum   - her son turned 1 2023  4)Prior LTCS + short interval pregnancy   -Repeat  schedule for 3/4 @0800                       OB History    Para Term  AB Living   2 1 1     1   SAB IAB Ectopic Molar Multiple Live Births              1       # Outcome Date GA Lbr Gene/2nd Weight Sex Delivery Anes PTL Lv   2 Current                     1 Term 22     3.64 kg (8 lb 0.4 oz) M

## 2024-02-21 NOTE — PROGRESS NOTES
0745 Bedside and Verbal shift change report given to SAMANTHA Fragoso RN (oncoming nurse) by NATHANIEL Medina RN (offgoing nurse). Report included the following information Nurse Handoff Report, Intake/Output, MAR, Recent Results, and Med Rec Status. Patient doing well without complaints,  remains at bedside providing support.     0823 Patient arrived to OR 1.     0902 Live infant male delivered via RCS, placed on patient's chest skin to skin.     1004 Patient returned to LDR 12, PP recovery begun.     1210 Bedside and Verbal shift change report given to EUFEMIA Pickering RN (oncoming nurse) by SAMANTHA Fragoso RN (offgoing nurse). Report included the following information Nurse Handoff Report, Surgery Report, Intake/Output, and MAR.

## 2024-02-21 NOTE — ED PROVIDER NOTES
Department of Obstetrics and Gynecology  Nurse Practitioner DANA Obstetrics History and Physical        CHIEF COMPLAINT:  leakage of amniotic fluid    HISTORY OF PRESENT ILLNESS:      The patient is a 33 y.o.  2 parity 1001 at 37w3d with an RESHMA of 3/10/24.  Patient presents to the [facility] for PROM at 0030 this morning. Reports a large gush of clear fluid and continued leaking and gushing since then. Wore a diaper into DANA and it is saturated. Denies VB and ctx. Endorses good fetal movement.     PRENATAL CARE:    Primary Provider: Joel Chirinos for delivery    - h/o Primary LTCS, Breech presentation. 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  [place]   IOB labs: Pap Smear 23 NILM, Other labs wnl. A positive   Genetic Screening: MaT21+Core low risk - BOY! \"Eliseo\", CF neg, SMA neg, AFP neg  Anatomy: Needed additional views. Follow-up normal. Anterior placenta   3rd TM labs: GTT 94 Hgb 11.3. Platelet: 404  Flu: given TDAP: 24   Rhogam: A POSITIVE  GBS:  Postpartum Care:  -planning formula   -Desires circumcision    -Pap 2023, NILM      Problem List:  1) Acquired Hypothyroidism  - on Unithroid 200 mcg  - followed by Dr. Tim Ricci, endocrinology   -TFT's 23 normal   -After delivery: 1 tab 6 days a week & skip one day a week   2) Hx of Hodgkins Lymphoma, diagnosed @ 18 yo.   3) Short Interval Pregnancy  - conceived 9 months postpartum   - her son turned 1 2023  4)Prior LTCS + short interval pregnancy   -Repeat  schedule for 3/4 @0800    OB History    Para Term  AB Living   2 1 1     1   SAB IAB Ectopic Molar Multiple Live Births             1      # Outcome Date GA Lbr Gene/2nd Weight Sex Delivery Anes PTL Lv   2 Current            1 Term 22   3.64 kg (8 lb 0.4 oz) M CS-LTranv Spinal N JOSE      Complications: Breech presentation of fetus       Past Medical History:

## 2024-02-21 NOTE — ANESTHESIA PROCEDURE NOTES
Spinal Block    Patient location during procedure: OR  End time: 2/21/2024 8:33 AM  Reason for block: primary anesthetic  Staffing  Performed: anesthesiologist   Anesthesiologist: Basim Fuentes MD  Performed by: Basim Fuentes MD  Authorized by: Basim Fuentes MD    Spinal Block  Patient position: sitting  Prep: DuraPrep  Patient monitoring: cardiac monitor, continuous pulse ox, frequent blood pressure checks and oxygen  Approach: midline  Location: L3/L4  Provider prep: mask and sterile gloves  Needle  Needle type: pencil-tip   Needle gauge: 24 G  Needle length: 4 in  Assessment  Sensory level: T4  Events: None  Swirl obtained: Yes  CSF: clear  Attempts: 1  Hemodynamics: stable  Preanesthetic Checklist  Completed: patient identified, IV checked, site marked, risks and benefits discussed, surgical/procedural consents, equipment checked, pre-op evaluation, timeout performed, anesthesia consent given, oxygen available, monitors applied/VS acknowledged and fire risk safety assessment completed and verbalized

## 2024-02-21 NOTE — ANESTHESIA PRE PROCEDURE
Cardiovascular:Negative CV ROS  Exercise tolerance: good (>4 METS)                    Neuro/Psych:   Negative Neuro/Psych ROS              GI/Hepatic/Renal: Neg GI/Hepatic/Renal ROS            Endo/Other: Negative Endo/Other ROS                    Abdominal: normal exam            Vascular: negative vascular ROS.         Other Findings:             Anesthesia Plan      spinal     ASA 2       Induction: intravenous.    MIPS: Postoperative opioids intended and Prophylactic antiemetics administered.  Anesthetic plan and risks discussed with patient.      Plan discussed with CRNA.    Attending anesthesiologist reviewed and agrees with Preprocedure content      Post-op pain plan if not by surgeon: intrathecal narcotics            Basim Fuentes MD   2/21/2024

## 2024-02-22 LAB
ERYTHROCYTE [DISTWIDTH] IN BLOOD BY AUTOMATED COUNT: 13 % (ref 11.5–14.5)
GP B STREP DNA SPEC QL NAA+PROBE: NEGATIVE
HCT VFR BLD AUTO: 31.4 % (ref 35–47)
HGB BLD-MCNC: 10.5 G/DL (ref 11.5–16)
MCH RBC QN AUTO: 29.2 PG (ref 26–34)
MCHC RBC AUTO-ENTMCNC: 33.4 G/DL (ref 30–36.5)
MCV RBC AUTO: 87.5 FL (ref 80–99)
NRBC # BLD: 0 K/UL (ref 0–0.01)
NRBC BLD-RTO: 0 PER 100 WBC
PLATELET # BLD AUTO: 347 K/UL (ref 150–400)
PMV BLD AUTO: 10.1 FL (ref 8.9–12.9)
RBC # BLD AUTO: 3.59 M/UL (ref 3.8–5.2)
SPECIMEN STATUS REPORT: NORMAL
WBC # BLD AUTO: 9.9 K/UL (ref 3.6–11)

## 2024-02-22 PROCEDURE — 6360000002 HC RX W HCPCS: Performed by: ANESTHESIOLOGY

## 2024-02-22 PROCEDURE — 51701 INSERT BLADDER CATHETER: CPT

## 2024-02-22 PROCEDURE — 36415 COLL VENOUS BLD VENIPUNCTURE: CPT

## 2024-02-22 PROCEDURE — 51798 US URINE CAPACITY MEASURE: CPT

## 2024-02-22 PROCEDURE — 1120000000 HC RM PRIVATE OB

## 2024-02-22 PROCEDURE — 85027 COMPLETE CBC AUTOMATED: CPT

## 2024-02-22 PROCEDURE — 6370000000 HC RX 637 (ALT 250 FOR IP): Performed by: ANESTHESIOLOGY

## 2024-02-22 PROCEDURE — 6370000000 HC RX 637 (ALT 250 FOR IP): Performed by: OBSTETRICS & GYNECOLOGY

## 2024-02-22 RX ADMIN — ACETAMINOPHEN 1000 MG: 500 TABLET ORAL at 18:14

## 2024-02-22 RX ADMIN — DOCUSATE SODIUM 100 MG: 100 CAPSULE, LIQUID FILLED ORAL at 10:23

## 2024-02-22 RX ADMIN — KETOROLAC TROMETHAMINE 30 MG: 30 INJECTION, SOLUTION INTRAMUSCULAR; INTRAVENOUS at 08:52

## 2024-02-22 RX ADMIN — IBUPROFEN 800 MG: 400 TABLET, FILM COATED ORAL at 16:26

## 2024-02-22 RX ADMIN — OXYCODONE 5 MG: 5 TABLET ORAL at 16:40

## 2024-02-22 RX ADMIN — OXYCODONE 5 MG: 5 TABLET ORAL at 12:02

## 2024-02-22 RX ADMIN — ACETAMINOPHEN 1000 MG: 500 TABLET ORAL at 10:24

## 2024-02-22 RX ADMIN — SIMETHICONE 80 MG: 80 TABLET, CHEWABLE ORAL at 08:57

## 2024-02-22 RX ADMIN — POLYETHYLENE GLYCOL 3350 17 G: 17 POWDER, FOR SOLUTION ORAL at 10:24

## 2024-02-22 RX ADMIN — OXYCODONE 5 MG: 5 TABLET ORAL at 21:16

## 2024-02-22 RX ADMIN — BISACODYL 5 MG: 5 TABLET, COATED ORAL at 12:36

## 2024-02-22 RX ADMIN — DOCUSATE SODIUM 100 MG: 100 CAPSULE, LIQUID FILLED ORAL at 21:16

## 2024-02-22 RX ADMIN — SIMETHICONE 80 MG: 80 TABLET, CHEWABLE ORAL at 16:53

## 2024-02-22 ASSESSMENT — PAIN SCALES - GENERAL
PAINLEVEL_OUTOF10: 5
PAINLEVEL_OUTOF10: 3
PAINLEVEL_OUTOF10: 5
PAINLEVEL_OUTOF10: 5
PAINLEVEL_OUTOF10: 4
PAINLEVEL_OUTOF10: 5

## 2024-02-22 ASSESSMENT — PAIN DESCRIPTION - ORIENTATION
ORIENTATION: LOWER;ANTERIOR
ORIENTATION: ANTERIOR;LOWER
ORIENTATION: ANTERIOR;LOWER

## 2024-02-22 ASSESSMENT — PAIN DESCRIPTION - LOCATION
LOCATION: ABDOMEN
LOCATION: INCISION
LOCATION: INCISION

## 2024-02-22 ASSESSMENT — PAIN DESCRIPTION - DESCRIPTORS
DESCRIPTORS: SORE

## 2024-02-22 NOTE — LACTATION NOTE
This note was copied from a baby's chart.  Mother states that she wants to feed baby formula. Mother states that she had a traumatic experience with breast feeding her first son when he has a posterior tongue tie. She stopped breastfeeding her first son at one month old. Mother was interested in just feeding baby colostrum. Discussed hand expression of colostrum and finger feeding or spoon feeding baby. Mother asked about how to \"dry up\" when her milk came in. Educated mother on not simulating the breasts, using cold compresses, using cabbage leaves, and discussing medication options with doctor/midwife.

## 2024-02-22 NOTE — PROGRESS NOTES
Post-Operative Day Number 1 OBGYN Progress Note    Patient doing well post-op day 1 from  delivery. Pain controlled on current medication. Required velez replacement last night. Normal lochia. Eliseo is doing well.     Vitals:  Patient Vitals for the past 8 hrs:   BP Temp Temp src Pulse Resp SpO2   24 0945 106/70 98.5 °F (36.9 °C) Oral 99 16 97 %     Temp (24hrs), Av.5 °F (36.9 °C), Min:98.3 °F (36.8 °C), Max:98.7 °F (37.1 °C)      Vital signs stable, afebrile.    Exam:  Patient without distress.               Abdomen soft, fundus firm at level of umbilicus, nontender.                 Dressing clean, dry and intact               Lower extremities are negative for swelling, cords or tenderness.    Labs:   Recent Results (from the past 24 hour(s))   CBC    Collection Time: 24  6:21 AM   Result Value Ref Range    WBC 9.9 3.6 - 11.0 K/uL    RBC 3.59 (L) 3.80 - 5.20 M/uL    Hemoglobin 10.5 (L) 11.5 - 16.0 g/dL    Hematocrit 31.4 (L) 35.0 - 47.0 %    MCV 87.5 80.0 - 99.0 FL    MCH 29.2 26.0 - 34.0 PG    MCHC 33.4 30.0 - 36.5 g/dL    RDW 13.0 11.5 - 14.5 %    Platelets 347 150 - 400 K/uL    MPV 10.1 8.9 - 12.9 FL    Nucleated RBCs 0.0 0  WBC    nRBC 0.00 0.00 - 0.01 K/uL       Assessment and Plan:    POD#1 s/p RLTCS at 37w3d following PROM   -post op hemoglobin stable   -pain controlled  -dressing dry  Urinary retention  - velez catheter replaced  -keep for 24 hours  Male   -twisted raphe     CCM  Pain control as needed   Velez out in AM  Circ as outpatient       Steph Chirinos MD, FACOG

## 2024-02-22 NOTE — OP NOTE
[address]  [address]                            OPERATIVE REPORT      PATIENT NAME: LISET CANDELARIO           : 1990  MED REC NO: [id number]                       ROOM:   ACCOUNT NO: [id number]                       ADMIT DATE: 2024  PROVIDER: Steph Chirinos MD    DATE OF SERVICE:  2024    PREOPERATIVE DIAGNOSES:       1. G2, P1-0-0-1 at 37 weeks 3 days.     2. Prior low-transverse  section, short interval pregnancy.     3. PROM.    POSTOPERATIVE DIAGNOSES:       1. G2, P1-0-0-1 at 37 weeks 3 days.     2. Prior low-transverse  section, short interval pregnancy.     3. PROM.     4. Live born male .    PROCEDURES PERFORMED:  Repeat low-transverse  section.    SURGEON:  Steph Chirinos MD    ASSISTANT:  Luisa Gonzalez RN.    ANESTHESIA:  Spinal.    ESTIMATED BLOOD LOSS:  600 mL.    SPECIMENS REMOVED:  None.    INTRAOPERATIVE FINDINGS:  Old skin incision appeared to have a keloid.  Minimal, clear amniotic fluid.  Right rectus muscle displaced laterally.  Live born male  on the vertex presentation, difficult fetal extraction requiring one pull of Kiwi vacuum, dense scarring at the level of the fascia.     COMPLICATIONS:  None.    IMPLANTS:  None.    INDICATIONS:  None.    DESCRIPTION OF PROCEDURE:  After informed consent was obtained, the patient was taken to the operating room, placed in the dorsal lithotomy position with her legs in stirrups. She was prepped and draped in normal sterile fashion.  Time-out was performed to ensure correct patient and correct procedure.  A Pfannenstiel incision was made just approximately 2 cm above the pubic symphysis and carried through to the underlying layer of fascia.  Fascia was nicked in the midline and then the incision was extended laterally with the Corral scissors. The superficial aspect of the fascial incision was elevated on either side of  placed on top.  The patient tolerated the procedure well and was transferred to recovery room in stable condition.  All sponge, needle, and instrument counts were correct at the end of the procedure.            MD STEPHANIE PATEL/LANCE  D:  02/21/2024 22:00:20  T:  02/21/2024 23:07:06  JOB #:  [id number]

## 2024-02-23 VITALS
DIASTOLIC BLOOD PRESSURE: 76 MMHG | RESPIRATION RATE: 16 BRPM | HEART RATE: 89 BPM | SYSTOLIC BLOOD PRESSURE: 119 MMHG | OXYGEN SATURATION: 97 % | TEMPERATURE: 98.6 F

## 2024-02-23 PROCEDURE — 6370000000 HC RX 637 (ALT 250 FOR IP): Performed by: OBSTETRICS & GYNECOLOGY

## 2024-02-23 RX ORDER — IBUPROFEN 800 MG/1
800 TABLET ORAL EVERY 8 HOURS
Qty: 120 TABLET | Refills: 3 | Status: SHIPPED | OUTPATIENT
Start: 2024-02-23

## 2024-02-23 RX ORDER — SIMETHICONE 80 MG
80 TABLET,CHEWABLE ORAL EVERY 6 HOURS PRN
Qty: 30 TABLET | Refills: 0 | Status: SHIPPED | OUTPATIENT
Start: 2024-02-23

## 2024-02-23 RX ORDER — OXYCODONE HYDROCHLORIDE 10 MG/1
10 TABLET ORAL EVERY 6 HOURS PRN
Qty: 12 TABLET | Refills: 0 | Status: SHIPPED | OUTPATIENT
Start: 2024-02-23 | End: 2024-02-26

## 2024-02-23 RX ADMIN — POLYETHYLENE GLYCOL 3350 17 G: 17 POWDER, FOR SOLUTION ORAL at 10:06

## 2024-02-23 RX ADMIN — OXYCODONE 5 MG: 5 TABLET ORAL at 17:12

## 2024-02-23 RX ADMIN — IBUPROFEN 800 MG: 400 TABLET, FILM COATED ORAL at 10:05

## 2024-02-23 RX ADMIN — DOCUSATE SODIUM 100 MG: 100 CAPSULE, LIQUID FILLED ORAL at 10:05

## 2024-02-23 RX ADMIN — ACETAMINOPHEN 1000 MG: 500 TABLET ORAL at 05:55

## 2024-02-23 RX ADMIN — OXYCODONE 5 MG: 5 TABLET ORAL at 10:11

## 2024-02-23 RX ADMIN — BISACODYL 5 MG: 5 TABLET, COATED ORAL at 10:06

## 2024-02-23 RX ADMIN — IBUPROFEN 800 MG: 400 TABLET, FILM COATED ORAL at 00:59

## 2024-02-23 RX ADMIN — ACETAMINOPHEN 1000 MG: 500 TABLET ORAL at 10:06

## 2024-02-23 ASSESSMENT — PAIN DESCRIPTION - ORIENTATION
ORIENTATION: LOWER

## 2024-02-23 ASSESSMENT — PAIN DESCRIPTION - LOCATION
LOCATION: ABDOMEN
LOCATION: INCISION
LOCATION: INCISION
LOCATION: ABDOMEN;INCISION

## 2024-02-23 ASSESSMENT — PAIN DESCRIPTION - DESCRIPTORS
DESCRIPTORS: ACHING
DESCRIPTORS: SORE
DESCRIPTORS: ACHING
DESCRIPTORS: SORE

## 2024-02-23 ASSESSMENT — PAIN SCALES - GENERAL
PAINLEVEL_OUTOF10: 6
PAINLEVEL_OUTOF10: 4
PAINLEVEL_OUTOF10: 5
PAINLEVEL_OUTOF10: 5

## 2024-02-23 ASSESSMENT — PAIN - FUNCTIONAL ASSESSMENT: PAIN_FUNCTIONAL_ASSESSMENT: ACTIVITIES ARE NOT PREVENTED

## 2024-02-23 NOTE — DISCHARGE SUMMARY
Obstetrical Discharge Summary     Name: Christen Lee MRN: [id number]  SSN: [id number]    YOB: 1990  Age: 33 y.o.  Sex: female      Admit Date: 2024    Discharge Date: 2024     Admitting Physician: Osei Ricci MD     Attending Physician:  No att. providers found     Admission Diagnoses: Leakage of amniotic fluid [O42.90]    Discharge Diagnoses:   Information for the patient's :  BRENDA Lee [[id number]]   @[id number]@      Additional Diagnoses:  No components found for: \"OBEXTABORH\", \"OBEXTABSCRN\", \"OBEXTRUBELLA\", \"OBEXTGRBS\"    Hospital Course: Normal hospital course following the delivery.    Disposition at Discharge: Home or self care    Discharged Condition: Stable    Patient Instructions:   Discharge Medication List as of 2024  4:39 PM        START taking these medications    Details   oxyCODONE (OXY-IR) 10 MG immediate release tablet Take 1 tablet by mouth every 6 hours as needed for Pain for up to 3 days. Max Daily Amount: 40 mg, Disp-12 tablet, R-0Normal      ibuprofen (ADVIL;MOTRIN) 800 MG tablet Take 1 tablet by mouth in the morning and 1 tablet at noon and 1 tablet in the evening., Disp-120 tablet, R-3Normal      simethicone (MYLICON) 80 MG chewable tablet Take 1 tablet by mouth every 6 hours as needed for Flatulence (gas pain), Disp-30 tablet, R-0Normal           CONTINUE these medications which have NOT CHANGED    Details   Prenatal Vit-Fe Fumarate-FA (PRENATAL FA PO) Take by mouthHistorical Med      Lactobacillus (PROBIOTIC ACIDOPHILUS PO) Take by mouthHistorical Med      UNITHROID 200 MCG tablet Take 1 tablet by mouth Daily BRAND MEDICALLY NECESSARY--Delete 150 mcg dose from profile, Disp-90 tablet, R-3, DAWNormal             Reference my discharge instructions.    No follow-ups on file.     Signed By:  Steph Chirinos MD     2024

## 2024-02-23 NOTE — PROGRESS NOTES
Post-Operative Day Number 2 OBGYN Progress Note    Patient doing well post-op day 2 from  delivery. Pain controlled on current medication. Has voided twice since velez removal. Normal lochia. Eliseo is doing well. Considering DC home this weekend.     Vitals:  No data found.    Temp (24hrs), Av.2 °F (36.8 °C), Min:98 °F (36.7 °C), Max:98.4 °F (36.9 °C)      Vital signs stable, afebrile.    Exam:  Patient without distress.               Abdomen soft, fundus firm at level of umbilicus, nontender.                 Incision CDI               Lower extremities are negative for swelling, cords or tenderness.    Labs:   No results found for this or any previous visit (from the past 24 hour(s)).      Assessment and Plan:    POD#2 s/p RLTCS at 37w3d following PROM   -pain controlled  -passing gas and voiding. No BM yet.   Urinary retention  -resolved   Male   -twisted raphe   -outpt circ       CCM  Considering DC home today   Will check back this afternoon   Also waiting for clearance of Eliseo for possible DC today       Steph Chirinos MD, FACOG

## 2024-02-27 ENCOUNTER — ROUTINE PRENATAL (OUTPATIENT)
Age: 34
End: 2024-02-27

## 2024-02-27 VITALS — DIASTOLIC BLOOD PRESSURE: 74 MMHG | BODY MASS INDEX: 25.01 KG/M2 | WEIGHT: 148 LBS | SYSTOLIC BLOOD PRESSURE: 130 MMHG

## 2024-02-27 DIAGNOSIS — Z98.890 POST-OPERATIVE STATE: Primary | ICD-10-CM

## 2024-02-27 PROCEDURE — 0503F POSTPARTUM CARE VISIT: CPT | Performed by: OBSTETRICS & GYNECOLOGY

## 2024-02-27 RX ORDER — ACETAMINOPHEN 500 MG
500 TABLET ORAL EVERY 6 HOURS PRN
COMMUNITY

## 2024-02-27 NOTE — PROGRESS NOTES
GYN Problem Visit    Christen Lee is a 33 y.o.  presenting for problem visit. Her main concern today is she is post op from a CS 6 days. and baby are present. Feeling well. No fevers or chills. CP/SOB. Minimal narcotic use.     Ob/Gyn Hx:  G P  -        Past Medical History:   Diagnosis Date    Hodgkin's lymphoma (HCC) Age 19    s/p chemo and xrt    Hypothyroid 2015       Past Surgical History:   Procedure Laterality Date     SECTION  2022     SECTION N/A 2024     SECTION performed by Steph Chirinos MD at [facility] L[facility]    OTHER SURGICAL HISTORY      had some of the enlarged lymph nodes removed during time of port placement for chemo    OTHER SURGICAL HISTORY      Portocath insertion/removal       Family History   Problem Relation Age of Onset    Breast Cancer Paternal Grandmother     Elevated Lipids Father     Cancer Maternal Grandfather         melanoma    Coronary Art Dis Paternal Grandfather 65    Pneumonia Paternal Grandmother     Hypertension Father     Cancer Father         sarcoma    Thyroid Disease Mother         hypothyroidism       Social History     Socioeconomic History    Marital status:      Spouse name: Not on file    Number of children: Not on file    Years of education: Not on file    Highest education level: Not on file   Occupational History    Not on file   Tobacco Use    Smoking status: Never    Smokeless tobacco: Never   Vaping Use    Vaping Use: Never used   Substance and Sexual Activity    Alcohol use: Not Currently    Drug use: No    Sexual activity: Yes     Partners: Male     Birth control/protection: None   Other Topics Concern    Not on file   Social History Narrative    Lives in [place] with .   in 2017.  Works at [employer] as  for [employer].  Likes to run and hike.  Ran and pole-vaulted in college for [employer] and [employer].     Social Determinants of Health     Financial Resource Strain: Not on

## 2024-04-01 NOTE — PROGRESS NOTES
Postpartum Visit    Christen Lee is a 33 y.o.  presenting for her postpartum visit.  She delivered on 24 by LTCS and delivery was uncomplicated.   She has been doing well since discharged from the hospital with a normal postpartum course.    Bleeding - done bleeding, no period yet  Perineal/Incisional pain - mild tenderness   Mood - GREAT!  Feeding - Formula  Contraception - plan to use condoms     Pap 23 NILM    Past Medical History:   Diagnosis Date    Hodgkin's lymphoma (HCC) Age 19    s/p chemo and xrt    Hypothyroid 2015       Past Surgical History:   Procedure Laterality Date     SECTION  2022     SECTION N/A 2024     SECTION performed by Steph Chirinos MD at [facility] L&D OR    OTHER SURGICAL HISTORY      had some of the enlarged lymph nodes removed during time of port placement for chemo    OTHER SURGICAL HISTORY      Portocath insertion/removal       Family History   Problem Relation Age of Onset    Breast Cancer Paternal Grandmother     Elevated Lipids Father     Cancer Maternal Grandfather         melanoma    Coronary Art Dis Paternal Grandfather 65    Pneumonia Paternal Grandmother     Hypertension Father     Cancer Father         sarcoma    Thyroid Disease Mother         hypothyroidism       Social History     Socioeconomic History    Marital status:      Spouse name: Not on file    Number of children: Not on file    Years of education: Not on file    Highest education level: Not on file   Occupational History    Not on file   Tobacco Use    Smoking status: Never    Smokeless tobacco: Never   Vaping Use    Vaping Use: Never used   Substance and Sexual Activity    Alcohol use: Not Currently    Drug use: No    Sexual activity: Yes     Partners: Male     Birth control/protection: None   Other Topics Concern    Not on file   Social History Narrative    Lives in [place] with .   in 2017.  Works at [employer] as  for

## 2024-04-02 ENCOUNTER — POSTPARTUM VISIT (OUTPATIENT)
Age: 34
End: 2024-04-02

## 2024-04-02 VITALS — DIASTOLIC BLOOD PRESSURE: 82 MMHG | WEIGHT: 140 LBS | SYSTOLIC BLOOD PRESSURE: 120 MMHG | BODY MASS INDEX: 23.66 KG/M2

## 2024-04-02 DIAGNOSIS — M62.08 RECTUS DIASTASIS: ICD-10-CM

## 2024-04-02 PROCEDURE — 0503F POSTPARTUM CARE VISIT: CPT | Performed by: OBSTETRICS & GYNECOLOGY

## 2024-04-05 DIAGNOSIS — E28.2 POLYCYSTIC OVARIAN SYNDROME: ICD-10-CM

## 2024-04-05 DIAGNOSIS — E03.9 ACQUIRED HYPOTHYROIDISM: ICD-10-CM

## 2024-04-18 LAB
T4 FREE SERPL-MCNC: 1.77 NG/DL (ref 0.82–1.77)
TSH SERPL DL<=0.005 MIU/L-ACNC: 0.08 UIU/ML (ref 0.45–4.5)

## 2024-04-19 LAB
BUN SERPL-MCNC: 13 MG/DL (ref 6–20)
BUN/CREAT SERPL: 17 (ref 9–23)
CALCIUM SERPL-MCNC: 9.9 MG/DL (ref 8.7–10.2)
CHLORIDE SERPL-SCNC: 103 MMOL/L (ref 96–106)
CO2 SERPL-SCNC: 21 MMOL/L (ref 20–29)
CREAT SERPL-MCNC: 0.75 MG/DL (ref 0.57–1)
EGFRCR SERPLBLD CKD-EPI 2021: 109 ML/MIN/1.73
GLUCOSE SERPL-MCNC: 92 MG/DL (ref 70–99)
POTASSIUM SERPL-SCNC: 3.8 MMOL/L (ref 3.5–5.2)
SODIUM SERPL-SCNC: 140 MMOL/L (ref 134–144)

## 2024-04-22 LAB — TESTOST SERPL-MCNC: 15.1 NG/DL (ref 10–55)

## 2024-04-23 ENCOUNTER — OFFICE VISIT (OUTPATIENT)
Age: 34
End: 2024-04-23
Payer: COMMERCIAL

## 2024-04-23 VITALS
WEIGHT: 139.6 LBS | BODY MASS INDEX: 23.26 KG/M2 | SYSTOLIC BLOOD PRESSURE: 112 MMHG | HEART RATE: 71 BPM | DIASTOLIC BLOOD PRESSURE: 70 MMHG | HEIGHT: 65 IN

## 2024-04-23 DIAGNOSIS — E28.2 POLYCYSTIC OVARIAN SYNDROME: ICD-10-CM

## 2024-04-23 DIAGNOSIS — E03.9 ACQUIRED HYPOTHYROIDISM: Primary | ICD-10-CM

## 2024-04-23 PROCEDURE — 99214 OFFICE O/P EST MOD 30 MIN: CPT | Performed by: INTERNAL MEDICINE

## 2024-04-23 RX ORDER — LEVOTHYROXINE SODIUM 150 UG/1
150 TABLET ORAL DAILY
Qty: 90 TABLET | Refills: 3 | Status: SHIPPED | OUTPATIENT
Start: 2024-04-23

## 2024-04-23 NOTE — PATIENT INSTRUCTIONS
1) TSH is a thyroid test.  Your level is 0.079 which is low and below goal of 0.45-2.0.  The TSH goes opposite of your thyroid dose and suggests your dose of unithroid is more than you need now that you have delivered.  I will decrease your dose back to 150 mcg daily 7 days a week and have sent a new prescription to your local pharmacy.    Tomorrow skip your dose of 200 mcg and start the 150 mcg tab on Thurs.    2) Do this for 6-8 weeks and then repeat your labs.   I will send you a message through [employer] with your lab results.    3) BUN and creatinine are markers of kidney function.  Your values are normal.    4) Your electrolytes (sodium, potassium, and calcium) are all normal.  Your glucose (blood sugar) is normal.    5) Your testosterone is currently normal so we'll follow this for now.

## 2024-04-23 NOTE — PROGRESS NOTES
the same.  May have some gluten intolerance even though she doesn't have celiac that could be affecting her absorption of medication and since unithroid is gluten free and is finally regulating her levels, will have her stay on this brand.  Became pregnant in 1/22 and increased her dose to 8 tabs/week and TSH 1.65 in 2/22 so kept dose the same.  TSH 8.68 in 7/22 but had missed doses and not waiting 30 min before coffee and pnv was within an hour so kept dose the same.  Delivered in 9/22 and TSH 0.10 and FT4 1.79 in 12/22 so decreased to 7 tabs/week and TSH 0.21 in 1/23 so decreased to 6.5 tabs/week.  TSH up to 5.46 in 7/23 but found out she was pregnant 2 weeks before lab draw and had not taken a double dose on the weekend so advised her to start taking 8 tabs/week for the next 4-6 weeks and then repeat her labs but this was never drawn until TSH 6.4 in 11/23 so increased to 200 mcg daily and TSH 0.9 in 12/23 so kept dose the same.  Delivered in 2/24 and decreased to 6 tabs/week but TSH 0.07 in 4/24 so decreased back to 150 mcg daily  - decrease unithroid to 150 mcg daily  - check TSH and free T4 in 6-8 weeks and prior to next visit         2. PCOS (polycystic ovarian syndrome): Her T was 20.1 in 7/20 but up to 29 in 10/20 and was not having her periods so started metformin  mg and her periods restarted within the next month but also stopped OCP at the same time.  We agreed to stop metformin for the next month and see if her periods continue on their own and they have and her T was down to 24 in 6/21 so had her stay off metformin and OCP for now and T was 15 in 12/21.  Became pregnant in 1/22 and delivered in 9/22 and T up to 32 in 12/22.  Up to 55 in 7/23 but drawn during her 1st trimester of pregnancy so I was not concerned.  Delivered in 2/24 and down to 15 in 4/24. May need metformin again if level doesn't come under 30 in 6 months  - check total testosterone and bmp prior to next visit  - stay off

## 2024-06-04 DIAGNOSIS — E03.9 ACQUIRED HYPOTHYROIDISM: ICD-10-CM

## 2024-10-06 DIAGNOSIS — E03.9 ACQUIRED HYPOTHYROIDISM: ICD-10-CM

## 2024-10-06 DIAGNOSIS — E28.2 POLYCYSTIC OVARIAN SYNDROME: ICD-10-CM

## 2024-10-17 LAB
BUN SERPL-MCNC: 11 MG/DL (ref 6–20)
BUN/CREAT SERPL: 13 (ref 9–23)
CALCIUM SERPL-MCNC: 9.6 MG/DL (ref 8.7–10.2)
CHLORIDE SERPL-SCNC: 102 MMOL/L (ref 96–106)
CO2 SERPL-SCNC: 22 MMOL/L (ref 20–29)
CREAT SERPL-MCNC: 0.87 MG/DL (ref 0.57–1)
EGFRCR SERPLBLD CKD-EPI 2021: 91 ML/MIN/1.73
GLUCOSE SERPL-MCNC: 81 MG/DL (ref 70–99)
POTASSIUM SERPL-SCNC: 4.3 MMOL/L (ref 3.5–5.2)
SODIUM SERPL-SCNC: 139 MMOL/L (ref 134–144)
T4 FREE SERPL-MCNC: 1.68 NG/DL (ref 0.82–1.77)
TSH SERPL DL<=0.005 MIU/L-ACNC: 2.1 UIU/ML (ref 0.45–4.5)

## 2024-10-19 LAB — TESTOST SERPL-MCNC: 15.3 NG/DL (ref 10–55)

## 2024-10-25 ENCOUNTER — TELEMEDICINE (OUTPATIENT)
Age: 34
End: 2024-10-25
Payer: COMMERCIAL

## 2024-10-25 DIAGNOSIS — E28.2 POLYCYSTIC OVARIAN SYNDROME: ICD-10-CM

## 2024-10-25 DIAGNOSIS — E03.9 ACQUIRED HYPOTHYROIDISM: Primary | ICD-10-CM

## 2024-10-25 PROCEDURE — 99214 OFFICE O/P EST MOD 30 MIN: CPT | Performed by: INTERNAL MEDICINE

## 2024-10-25 NOTE — PATIENT INSTRUCTIONS
1) TSH is a thyroid test.  Your level is 2.1 which is normal and just above goal of 0.45-2.0.  The TSH goes opposite of your thyroid dose and suggests your dose of unithroid is perfect so I will keep your dose the same.    2) BUN and creatinine are markers of kidney function.  Your values are normal.    3) Your electrolytes (sodium, potassium, and calcium) are all normal.  Your glucose (blood sugar) is normal.    4) Your testosterone was normal at 15 so your PCOS is well controlled and we can stay off metformin.    5) Please come for a follow up visit on 10/24/25 at 1:30pm in our [place] office.    6) I put an order directly into the [facility] system to repeat your labs in the 1-2 weeks prior to your next visit so just ask for the order under my name and make a note on your calendar to have these done at that time.  This order was also put directly into the [facility] portal.  Go under menu and my record and scroll down to upcoming tests and procedures and you are welcome to print this off or bring a copy of the order using the [facility] angely on your phone to the lab. Thanks!    7) I will plan on seeing you back in one year but if you feel you need to have your labs checked sooner, please let me know.

## 2024-10-25 NOTE — PROGRESS NOTES
Chief Complaint   Patient presents with    Thyroid Problem     MyChart pt instructed to wait for link [phone]   PCP and pharmacy confirmed       **THIS IS A VIRTUAL VISIT VIA A VIDEO SYNCHRONOUS DISCUSSION. PATIENT AGREED TO HAVE THEIR CARE DELIVERED OVER A [employer] VIDEO VISIT IN PLACE OF THEIR REGULARLY SCHEDULED OFFICE VISIT**    History of Present Illness: Christen Lee is a 34 y.o. female here for follow up of thyroid.  Weight was 139 in 4/24 and about the same currently.  Overall energy is good on the current dose of unithroid 150 mcg daily.  Is followed by the [facility] cardiology clinic and is part of a study looking at her heart and was started on entresto 1/2 tab once daily a few weeks ago.  Bowels are regular.  No trouble with skin, hair, or nails or temperature.  Periods come back a month after delivery and have been regular.      Current Outpatient Medications   Medication Sig    Sacubitril-Valsartan (ENTRESTO PO) Take by mouth 0.5 tab daily    UNITHROID 150 MCG tablet Take 1 tablet by mouth Daily BRAND MEDICALLY NECESSARY--Delete 200 mcg dose from profile    Prenatal Vit-Fe Fumarate-FA (PRENATAL FA PO) Take by mouth daily    Lactobacillus (PROBIOTIC ACIDOPHILUS PO) Take by mouth daily     No current facility-administered medications for this visit.     No Known Allergies    Review of Systems: PER HPI    Physical Examination:  - GENERAL: NCAT, Appears well nourished   - EYES: EOMI, non-icteric, no proptosis   - Ear/Nose/Throat: NCAT, no visible inflammation or masses   - CARDIOVASCULAR: no cyanosis, no visible JVD   - RESPIRATORY: respiratory effort normal without any distress or labored breathing   - MUSCULOSKELETAL: Normal ROM of neck and upper extremities observed   - SKIN: No rash on face  - NEUROLOGIC:  No facial asymmetry (Cranial nerve 7 motor function), No gaze palsy   - PSYCHIATRIC: Normal affect, Normal insight and judgement       Data Reviewed:   Component      Latest Ref Rng 10/16/2024

## 2025-01-01 PROBLEM — Z34.90 PREGNANCY: Status: RESOLVED | Noted: 2023-11-29 | Resolved: 2025-01-01

## 2025-01-01 PROBLEM — O42.90 LEAKAGE OF AMNIOTIC FLUID: Status: RESOLVED | Noted: 2024-02-21 | Resolved: 2025-01-01

## 2025-01-01 NOTE — PROGRESS NOTES
Annual exam    Christen Lee is a 34 y.o.  presenting for annual exam. Her main concerns today include annual well women exam.    She declines a chaperone during the gynecologic exam today.     LMP:2024  Her periods are heavy in flow and last 5-7 days.  She does not have dysmenorrhea.  Problems: no problems  Birth Control: condoms  She does not have a history of MARLIN 2, 3 or cervical cancer.   With regard to the Gardisil vaccine, she is unsure if she has received     Past Medical History:   Diagnosis Date    Hodgkin's lymphoma (HCC) Age 19    s/p chemo and xrt    Hypothyroid 2015       Past Surgical History:   Procedure Laterality Date     SECTION  2022     SECTION N/A 2024     SECTION performed by Steph Chirinos MD at [facility] L&D OR    OTHER SURGICAL HISTORY      had some of the enlarged lymph nodes removed during time of port placement for chemo    OTHER SURGICAL HISTORY      Portocath insertion/removal       Family History   Problem Relation Age of Onset    Breast Cancer Paternal Grandmother     Pneumonia Paternal Grandmother     Elevated Lipids Father     Hypertension Father     Cancer Father         sarcoma    Cancer Maternal Grandfather         melanoma    Diabetes Maternal Grandfather     Coronary Art Dis Paternal Grandfather 65    Hypertension Paternal Grandfather     Thyroid Disease Mother         hypothyroidism    Hypertension Mother      Labor Mother        Social History     Socioeconomic History    Marital status:      Spouse name: Not on file    Number of children: Not on file    Years of education: Not on file    Highest education level: Not on file   Occupational History    Not on file   Tobacco Use    Smoking status: Never    Smokeless tobacco: Never   Vaping Use    Vaping status: Never Used   Substance and Sexual Activity    Alcohol use: Not Currently    Drug use: No    Sexual activity: Yes     Partners: Male     Birth

## 2025-01-02 ENCOUNTER — OFFICE VISIT (OUTPATIENT)
Age: 35
End: 2025-01-02
Payer: COMMERCIAL

## 2025-01-02 VITALS
WEIGHT: 137 LBS | BODY MASS INDEX: 23.15 KG/M2 | HEART RATE: 80 BPM | DIASTOLIC BLOOD PRESSURE: 84 MMHG | TEMPERATURE: 97.9 F | OXYGEN SATURATION: 97 % | SYSTOLIC BLOOD PRESSURE: 119 MMHG

## 2025-01-02 DIAGNOSIS — Z01.419 WELL WOMAN EXAM: Primary | ICD-10-CM

## 2025-01-02 PROCEDURE — 99395 PREV VISIT EST AGE 18-39: CPT | Performed by: OBSTETRICS & GYNECOLOGY

## 2025-01-02 RX ORDER — DOXYCYCLINE HYCLATE 50 MG/1
50 CAPSULE ORAL 2 TIMES DAILY
COMMUNITY

## 2025-01-02 ASSESSMENT — PATIENT HEALTH QUESTIONNAIRE - PHQ9
SUM OF ALL RESPONSES TO PHQ QUESTIONS 1-9: 0
SUM OF ALL RESPONSES TO PHQ QUESTIONS 1-9: 0
SUM OF ALL RESPONSES TO PHQ9 QUESTIONS 1 & 2: 0
2. FEELING DOWN, DEPRESSED OR HOPELESS: NOT AT ALL
SUM OF ALL RESPONSES TO PHQ QUESTIONS 1-9: 0
1. LITTLE INTEREST OR PLEASURE IN DOING THINGS: NOT AT ALL
SUM OF ALL RESPONSES TO PHQ QUESTIONS 1-9: 0

## 2025-01-02 NOTE — PROGRESS NOTES
Christen Lee is a 34 y.o. female returns for an annual exam.    Chief Complaint   Patient presents with    Annual Exam       LMP:12/23/2024  Her periods are heavy in flow and last 5-7 days.  She does not have dysmenorrhea.  Problems: no problems  Birth Control: condoms  Last Pap:   She does not have a history of MARLIN 2, 3 or cervical cancer.   With regard to the Gardisil vaccine, she is unsure if she has received       1. Have you been to the ER, urgent care clinic, or hospitalized since your last visit?     2. Have you seen or consulted any other health care providers outside of the [facility] since your last visit? No

## 2025-01-08 LAB
CYTOLOGIST CVX/VAG CYTO: NORMAL
CYTOLOGY CVX/VAG DOC CYTO: NORMAL
CYTOLOGY CVX/VAG DOC THIN PREP: NORMAL
DX ICD CODE: NORMAL
HPV GENOTYPE REFLEX: NORMAL
HPV I/H RISK 4 DNA CVX QL PROBE+SIG AMP: NEGATIVE
Lab: NORMAL
OTHER STN SPEC: NORMAL
STAT OF ADQ CVX/VAG CYTO-IMP: NORMAL

## 2025-02-27 ENCOUNTER — TELEPHONE (OUTPATIENT)
Age: 35
End: 2025-02-27

## 2025-02-27 RX ORDER — LEVOTHYROXINE SODIUM 50 UG/1
150 TABLET ORAL DAILY
Qty: 28 TABLET | Refills: 0 | Status: SHIPPED | COMMUNITY
Start: 2025-02-27

## 2025-02-27 NOTE — TELEPHONE ENCOUNTER
We had the following [place] exchange:    Per our phone call:    I don't know if you could have developed a blue dye allergy as the 150 mcg tabs have a blue dye or you are allergic to something else in the unithroid tabs.    I pulled a sample of 50 mcg white dye-free tabs so you (or your  as you gave me verbal permission) can come by tomorrow between 9am-4:30pm aside from 12:30-1:30pm when we are at lunch to pick this up.    Take 3 of the 50 mcg tabs together once daily for the next 9 days and if you have any return of rosacea, then let me know and I will change you to a different brand called levoxyl but if you don't have any issue, then I will send a prescription to your local pharmacy for the 50 mcg tabs.      Thanks!      ===View-only below this line===      ----- Message -----       From:Christen Lee \"Lilian\"       Sent:2/27/2025  3:21 PM EST         To:Patient Medical Advice Request Message List    Subject:Thyroid - potential allergy     Yes I am. My phone has been acting up, can you call at [phone]?      ----- Message -----       From:Dr. Tim Ricci MD       Sent:2/27/2025  3:19 PM EST         To:Christen Lee \"Lilian\"    Subject:Thyroid - potential allergy     I'm trying to call you but it keeps going to voicemail.  Are you free to talk?      ----- Message -----       From:Christen Lee \"Lilian\"       Sent:2/27/2025  3:16 PM EST         To:Patient Medical Advice Request Message List    Subject:Thyroid - potential allergy     Would you recommend getting an allergy test done or is it possible to switch to a different medicine?      ----- Message -----       From:Christen Lee \"Lilian\"       Sent:2/27/2025  3:12 PM EST         To:Patient Medical Advice Request Message List    Subject:Thyroid - potential allergy     That is very interesting!     It looks like I was originally prescribed with doxycycline 1/21/2021 and first seen about “rosacea”.    I had been dealing with

## 2025-03-05 RX ORDER — LEVOTHYROXINE SODIUM 50 UG/1
150 TABLET ORAL DAILY
Qty: 270 TABLET | Refills: 3 | Status: SHIPPED | OUTPATIENT
Start: 2025-03-05

## 2025-04-03 ENCOUNTER — PATIENT MESSAGE (OUTPATIENT)
Age: 35
End: 2025-04-03

## 2025-04-07 RX ORDER — LEVOTHYROXINE SODIUM 50 UG/1
CAPSULE ORAL
Qty: 10 CAPSULE | Refills: 0 | Status: SHIPPED | COMMUNITY
Start: 2025-04-07

## 2025-04-07 RX ORDER — LEVOTHYROXINE SODIUM 100 UG/1
CAPSULE ORAL
Qty: 10 CAPSULE | Refills: 0 | Status: SHIPPED | COMMUNITY
Start: 2025-04-07

## 2025-04-17 ENCOUNTER — TELEPHONE (OUTPATIENT)
Age: 35
End: 2025-04-17

## 2025-04-17 RX ORDER — LEVOTHYROXINE SODIUM 150 UG/1
TABLET ORAL
Refills: 0 | OUTPATIENT
Start: 2025-04-17

## 2025-04-18 NOTE — TELEPHONE ENCOUNTER
PA initiated through [email or web address] for  Tirosint 150 mcg    Tirosint 150 mcg approved 04/18/2025 - 04/18/2026 PA #: [facility] - [employer] -  - [place] - O [id number] PP. Pt notified via [employer]

## 2025-07-07 ENCOUNTER — PATIENT MESSAGE (OUTPATIENT)
Age: 35
End: 2025-07-07

## 2025-07-07 DIAGNOSIS — E03.9 ACQUIRED HYPOTHYROIDISM: Primary | ICD-10-CM

## 2025-07-11 LAB
T4 FREE SERPL-MCNC: 1.55 NG/DL (ref 0.82–1.77)
TSH SERPL DL<=0.005 MIU/L-ACNC: 1.73 UIU/ML (ref 0.45–4.5)

## (undated) DEVICE — SOLUTION IRRIG 1000ML STRL H2O USP PLAS POUR BTL

## (undated) DEVICE — PENCIL SMK EVAC 10 FT BLADE ELECTRD ROCKER FOR TELSCP

## (undated) DEVICE — Z DISCONTINUED USE 2882580 SPINAL TRAY NDL 24 GAX4 IN SPROTTE ORDER MUTLIPLES OF 10 EA

## (undated) DEVICE — GLOVE SURG SZ 6 THK91MIL LTX FREE SYN POLYISOPRENE ANTI

## (undated) DEVICE — ATTACHMENT SMK 3/8INX10FT VALLEYLAB

## (undated) DEVICE — STRIP,CLOSURE,WOUND,MEDI-STRIP,1/2X4: Brand: MEDLINE

## (undated) DEVICE — SUTURE SZ0 L36IN VIO ABSORB CT-1 NDL HALF CIR MONOCRYL PLUS

## (undated) DEVICE — APPLICATOR MEDICATED 26 CC SOLUTION HI LT ORNG CHLORAPREP

## (undated) DEVICE — SUTURE PLN GUT SZ 2-0 L27IN ABSRB YELLOWISH TAN L70MM XLH 53T

## (undated) DEVICE — SUTURE VCRL + SZ 0 L36IN ABSRB VLT L40MM CT 1/2 CIR TAPR VCP358H

## (undated) DEVICE — SUTURE VCRL + 1 L36IN ABSRB VLT CT-1 L36MM 1/2 CIR VCP347H

## (undated) DEVICE — SUTURE MCRYL SZ 3-0 L27IN ABSRB UD L60MM KS STR REV CUT Y523H

## (undated) DEVICE — C-SECTION-SFMC: Brand: MEDLINE INDUSTRIES, INC.

## (undated) DEVICE — SOLUTION IRRIG 1000ML 0.9% SOD CHL USP POUR PLAS BTL

## (undated) DEVICE — DRAPE FLD WRM W44XL66IN C6L FOR INTRATEMP SYS THERMABASIN

## (undated) DEVICE — DRESSING SIL W4XL5IN ANTIBACT GELLING FBR CYTOFORM